# Patient Record
Sex: FEMALE | Race: OTHER | HISPANIC OR LATINO | Employment: FULL TIME | URBAN - NONMETROPOLITAN AREA
[De-identification: names, ages, dates, MRNs, and addresses within clinical notes are randomized per-mention and may not be internally consistent; named-entity substitution may affect disease eponyms.]

---

## 2017-10-03 ENCOUNTER — OFFICE VISIT (OUTPATIENT)
Dept: MEDICAL GROUP | Facility: PHYSICIAN GROUP | Age: 37
End: 2017-10-03

## 2017-10-03 VITALS
BODY MASS INDEX: 37.53 KG/M2 | OXYGEN SATURATION: 97 % | RESPIRATION RATE: 18 BRPM | HEART RATE: 68 BPM | SYSTOLIC BLOOD PRESSURE: 178 MMHG | WEIGHT: 211.8 LBS | HEIGHT: 63 IN | DIASTOLIC BLOOD PRESSURE: 102 MMHG | TEMPERATURE: 97.6 F

## 2017-10-03 DIAGNOSIS — I10 HYPERTENSION, UNSPECIFIED TYPE: ICD-10-CM

## 2017-10-03 DIAGNOSIS — E66.9 OBESITY (BMI 35.0-39.9 WITHOUT COMORBIDITY): ICD-10-CM

## 2017-10-03 DIAGNOSIS — Z13.6 SCREENING FOR CARDIOVASCULAR CONDITION: ICD-10-CM

## 2017-10-03 DIAGNOSIS — Z00.00 HEALTHCARE MAINTENANCE: ICD-10-CM

## 2017-10-03 PROCEDURE — 99204 OFFICE O/P NEW MOD 45 MIN: CPT | Performed by: NURSE PRACTITIONER

## 2017-10-03 RX ORDER — LISINOPRIL AND HYDROCHLOROTHIAZIDE 20; 12.5 MG/1; MG/1
1 TABLET ORAL DAILY
Qty: 90 TAB | Refills: 1 | Status: SHIPPED | OUTPATIENT
Start: 2017-10-03 | End: 2018-03-26 | Stop reason: SDUPTHER

## 2017-10-03 RX ORDER — LISINOPRIL AND HYDROCHLOROTHIAZIDE 20; 12.5 MG/1; MG/1
1 TABLET ORAL DAILY
COMMUNITY
End: 2017-10-03 | Stop reason: SDUPTHER

## 2017-10-03 ASSESSMENT — PATIENT HEALTH QUESTIONNAIRE - PHQ9: CLINICAL INTERPRETATION OF PHQ2 SCORE: 0

## 2017-10-03 NOTE — PATIENT INSTRUCTIONS
Labs--in next couple of days    Will get outside records from Dr. Keith    Refilled BP medication     Follow up in 1 week with MA for BP check    Follow up with me in 2 months, sooner if needed

## 2017-10-03 NOTE — ASSESSMENT & PLAN NOTE
Patient is due for labs, these have been ordered. She declines flu shot today. We will get her outside records to determine status of other healthcare maintenance exams.

## 2017-10-03 NOTE — ASSESSMENT & PLAN NOTE
This is a chronic condition currently under control due to being out of medications for 6 weeks. Her blood pressure today is 178/102, and she does complain of headache, but does deny chest pain, shortness of breath, visual changes, tinnitus, epistaxis, peripheral edema or near-syncope. She was previously treated by provider in [place], we have requested those records. She states that she was instructed to follow-up with further evaluation including labs, she is not sure whether or not an ultrasound of her renal arteries was ordered. I did discuss with her the importance of further investigation including labs as well as ultrasound. Medication has been refilled. I would like her to return in one week to have her blood pressure checked by MA and I would like to see her back in 2 months with labs before her visit. She was given strict ER precautions.

## 2017-10-03 NOTE — ASSESSMENT & PLAN NOTE
Chronic, uncontrolled. Weight is 211 pounds, BMI 37.52. She was encouraged to eat healthy diet, increase physical activity when able and continue efforts towards weight loss. We will reassess this at her follow-up appointment.

## 2018-02-12 ENCOUNTER — OFFICE VISIT (OUTPATIENT)
Dept: MEDICAL GROUP | Facility: PHYSICIAN GROUP | Age: 38
End: 2018-02-12

## 2018-02-12 VITALS
SYSTOLIC BLOOD PRESSURE: 120 MMHG | HEART RATE: 96 BPM | BODY MASS INDEX: 35.97 KG/M2 | HEIGHT: 63 IN | WEIGHT: 203 LBS | OXYGEN SATURATION: 100 % | TEMPERATURE: 97.6 F | RESPIRATION RATE: 16 BRPM | DIASTOLIC BLOOD PRESSURE: 76 MMHG

## 2018-02-12 DIAGNOSIS — E11.9 TYPE 2 DIABETES MELLITUS WITHOUT COMPLICATION, WITHOUT LONG-TERM CURRENT USE OF INSULIN (HCC): ICD-10-CM

## 2018-02-12 DIAGNOSIS — N92.1 MENORRHAGIA WITH IRREGULAR CYCLE: ICD-10-CM

## 2018-02-12 DIAGNOSIS — E66.9 OBESITY (BMI 35.0-39.9 WITHOUT COMORBIDITY): ICD-10-CM

## 2018-02-12 DIAGNOSIS — I10 HYPERTENSION, UNSPECIFIED TYPE: ICD-10-CM

## 2018-02-12 PROCEDURE — 99214 OFFICE O/P EST MOD 30 MIN: CPT | Performed by: NURSE PRACTITIONER

## 2018-02-13 NOTE — ASSESSMENT & PLAN NOTE
This is a chronic condition, uncontrolled. Her weight is 203 pounds, BMI is 35.96. She is currently working on this especially since been recently diagnosed with type 2 diabetes. She is working on healthy diet, regular physical activity and continued efforts towards weight loss.

## 2018-02-13 NOTE — PROGRESS NOTES
Chief Complaint   Patient presents with   • Diabetes     fv [place] [facility]         This is a 37 y.o.female patient that presents today with the following: Status post hospitalization, recently diagnosed as diabetic    Type 2 diabetes mellitus without complication, without long-term current use of insulin (CMS-HCC)  The patient recently diagnosed with type 2 diabetes. She presented to the emergency room with allover body aches and a general feeling of unwellness. She thought that she had the flu, she was ultimately diagnosed with type 2 diabetes. Blood sugars were in the 500s. She does not recall what her hemoglobin A1c was. We have requested those records. She is on metformin now, 500 mg twice daily. She is also on lisinopril/hydrochlorothiazide 20-12.5 mg, she was very on this medication. She states that her blood sugars now are down into the low 100s.   She states she's been working very hard on her diet and exercise. She is going to follow-up with me in 3 months with routine fasting labs done before visit.    Obesity (BMI 35.0-39.9 without comorbidity) ([facility])  This is a chronic condition, uncontrolled. Her weight is 203 pounds, BMI is 35.96. She is currently working on this especially since been recently diagnosed with type 2 diabetes. She is working on healthy diet, regular physical activity and continued efforts towards weight loss.    Menorrhagia with irregular cycle  Patient would like referral to OB/GYN to discuss options for menorrhagia with irregular cycle. She has significantly painful and heavy periods. Referral has been placed for her.    Hypertension  This is a chronic condition, stable and fairly well-controlled on medications including lisinopril-change her thiazide. Blood pressure today is 120/76 and she denies symptoms of hypertension. She does not need refills at this time, but can call as needed. She tolerates it well with no significant bothersome side effects. She is going to have routine fasting  "labs to before she follows up with me in 3 months.      No visits with results within 1 Month(s) from this visit.   Latest known visit with results is:   No results found for any previous visit.         clinical course has been stable    Past Medical History:   Diagnosis Date   • Hypertension 2014       History reviewed. No pertinent surgical history.    History reviewed. No pertinent family history.    Patient has no known allergies.    Current Outpatient Prescriptions Ordered in [facility]   Medication Sig Dispense Refill   • metFORMIN (GLUCOPHAGE) 500 MG Tab Take 500 mg by mouth 2 times a day, with meals.     • lisinopril-hydrochlorothiazide (PRINZIDE, ZESTORETIC) 20-12.5 MG per tablet Take 1 Tab by mouth every day. 90 Tab 1     No current Epic-ordered facility-administered medications on file.        Constitutional ROS: No unexpected change in weight, No weakness, No unexplained fevers, sweats, or chills  Pulmonary ROS: No chronic cough, sputum, or hemoptysis, No shortness of breath, No recent change in breathing  Cardiovascular ROS: No chest pain, No edema, No palpitations, Positive for hypertension, per history of present illness  Gastrointestinal ROS: No abdominal pain, No nausea, vomiting, diarrhea, or constipation  Musculoskeletal/Extremities ROS: No clubbing, No peripheral edema, No pain, redness or swelling on the joints  Neurologic ROS: Normal development, No seizures, No weakness  Endocrine ROS: Positive per history of present illness  Genitourinary ROS: Positive per history of present illness    Physical exam:  /76   Pulse 96   Temp 36.4 °C (97.6 °F)   Resp 16   Ht 1.6 m (5' 3\")   Wt 92.1 kg (203 lb)   SpO2 100%   BMI 35.96 kg/m²   General Appearance: Young female, alert, no distress, obese, well-groomed  Skin: Skin color, texture, turgor normal. No rashes or lesions.  Lungs: negative findings: normal respiratory rate and rhythm, lungs clear to auscultation  Heart: negative. RRR without murmur, " gallop, or rubs.  No ectopy.  Abdomen: Abdomen soft, non-tender. BS normal. No masses,  No organomegaly  Musculoskeletal: negative findings: ROM of all joints is normal, no evidence of joint instability, strength normal, no deformities present  Neurologic: intact, oriented, mood appropriate, judgment intact. Cranial nerves II through XII grossly intact    Medical decision making/discussion: Patient to follow-up with me in 3 months with labs before visit. She is to continue working on healthy diet, regular physical activity and continued efforts towards weight loss. She has been referred to OB/GYN for further evaluation and treatment of menorrhagia.    Avril was seen today for diabetes.    Diagnoses and all orders for this visit:    Type 2 diabetes mellitus without complication, without long-term current use of insulin (CMS-[facility])  -     MICROALBUMIN CREAT RATIO URINE; Future  -     HEMOGLOBIN A1C; Future    Obesity (BMI 35.0-39.9 without comorbidity) ([facility])    Hypertension, unspecified type    Menorrhagia with irregular cycle  -     REFERRAL TO OB/GYN          Please note that this dictation was created using voice recognition software. I have made every reasonable attempt to correct obvious errors, but I expect that there are errors of grammar and possibly content that I did not discover before finalizing the note.

## 2018-02-13 NOTE — ASSESSMENT & PLAN NOTE
Patient would like referral to OB/GYN to discuss options for menorrhagia with irregular cycle. She has significantly painful and heavy periods. Referral has been placed for her.

## 2018-02-13 NOTE — TELEPHONE ENCOUNTER
*Medication is listed as historical*  Was the patient seen in the last year in this department? Yes     Does patient have an active prescription for medications requested? No     Received Request Via: Pharmacy    Pt met protocol?: Yes     Last OV 02/2018  No results found for: HBA1C     No results found for: CHOLSTRLTOT, TRIGLYCERIDE, HDL, LDL, CHOLHDLRAT, NONHDL

## 2018-02-13 NOTE — ASSESSMENT & PLAN NOTE
The patient recently diagnosed with type 2 diabetes. She presented to the emergency room with allover body aches and a general feeling of unwellness. She thought that she had the flu, she was ultimately diagnosed with type 2 diabetes. Blood sugars were in the 500s. She does not recall what her hemoglobin A1c was. We have requested those records. She is on metformin now, 500 mg twice daily. She is also on lisinopril/hydrochlorothiazide 20-12.5 mg, she was very on this medication. She states that her blood sugars now are down into the low 100s.   She states she's been working very hard on her diet and exercise. She is going to follow-up with me in 3 months with routine fasting labs done before visit.

## 2018-02-13 NOTE — ASSESSMENT & PLAN NOTE
This is a chronic condition, stable and fairly well-controlled on medications including lisinopril-change her thiazide. Blood pressure today is 120/76 and she denies symptoms of hypertension. She does not need refills at this time, but can call as needed. She tolerates it well with no significant bothersome side effects. She is going to have routine fasting labs to before she follows up with me in 3 months.

## 2018-03-26 DIAGNOSIS — I10 HYPERTENSION, UNSPECIFIED TYPE: ICD-10-CM

## 2018-03-27 NOTE — TELEPHONE ENCOUNTER
Was the patient seen in the last year in this department? Yes     Does patient have an active prescription for medications requested? No     Received Request Via: Pharmacy      Pt met protocol?: Yes pt last ov 2/18   BP Readings from Last 1 Encounters:   02/12/18 120/76

## 2018-03-28 RX ORDER — LISINOPRIL AND HYDROCHLOROTHIAZIDE 20; 12.5 MG/1; MG/1
TABLET ORAL
Qty: 90 TAB | Refills: 0 | Status: SHIPPED | OUTPATIENT
Start: 2018-03-28 | End: 2018-06-19 | Stop reason: SDUPTHER

## 2018-11-12 ENCOUNTER — OFFICE VISIT (OUTPATIENT)
Dept: MEDICAL GROUP | Facility: PHYSICIAN GROUP | Age: 38
End: 2018-11-12

## 2018-11-12 VITALS
DIASTOLIC BLOOD PRESSURE: 80 MMHG | HEIGHT: 63 IN | HEART RATE: 62 BPM | SYSTOLIC BLOOD PRESSURE: 138 MMHG | WEIGHT: 198 LBS | TEMPERATURE: 97.5 F | OXYGEN SATURATION: 100 % | RESPIRATION RATE: 18 BRPM | BODY MASS INDEX: 35.08 KG/M2

## 2018-11-12 DIAGNOSIS — I10 HYPERTENSION, UNSPECIFIED TYPE: ICD-10-CM

## 2018-11-12 DIAGNOSIS — E11.9 TYPE 2 DIABETES MELLITUS WITHOUT COMPLICATION, WITHOUT LONG-TERM CURRENT USE OF INSULIN (HCC): ICD-10-CM

## 2018-11-12 PROCEDURE — 99214 OFFICE O/P EST MOD 30 MIN: CPT | Performed by: NURSE PRACTITIONER

## 2018-11-12 RX ORDER — LISINOPRIL AND HYDROCHLOROTHIAZIDE 20; 12.5 MG/1; MG/1
TABLET ORAL
Qty: 90 TAB | Refills: 0 | Status: SHIPPED | OUTPATIENT
Start: 2018-11-12 | End: 2019-02-15 | Stop reason: SDUPTHER

## 2018-11-12 ASSESSMENT — PATIENT HEALTH QUESTIONNAIRE - PHQ9: CLINICAL INTERPRETATION OF PHQ2 SCORE: 0

## 2018-11-12 NOTE — PROGRESS NOTES
Chief Complaint   Patient presents with   • Hypertension     fv, med refills         This is a 37 y.o.female patient that presents today with the following: Medication refills, discuss chronic conditions    Hypertension  Patient here for follow-up of hypertension, she was last seen in February 2018.  Blood pressure today was 138/80 and she denies symptoms of hypertension.  She is currently on lisinopril-hydrochlorothiazide and states she is tolerating this well.  She was to have labs done after her last visit, she did not have them done.  She was advised to have her labs done as soon as possible, blood pressure medications were refilled for 90 days only until she can have her labs done.    Type 2 diabetes mellitus without complication, without long-term current use of insulin (CMS-HCC) (HCC)  When patient initially establish care with me February 2018 she had recently been diagnosed with type 2 diabetes.  She went to the emergency room with all over body aches and a general feeling of unwellness and was found to have random blood sugars in the 500s and she did not recall what her hemoglobin A1c was at that time.  She was started on metformin 500 mg twice daily, but felt that she did not need this medication anymore as her home blood sugars have been within normal limits.  I discussed with her the importance of having her labs done so we can determine what the status is and if we need to make any changes to her medications.  We discussed the importance of treating type 2 diabetes to protect the end organs.  She states she will have her labs done in the next couple of days.  We will go ahead and fill her metformin so she can have on hand should she need to start this medication.      No visits with results within 1 Month(s) from this visit.   Latest known visit with results is:   No results found for any previous visit.         clinical course has been stable    Past Medical History:   Diagnosis Date   • Hypertension  "2014       No past surgical history on file.    No family history on file.    Patient has no known allergies.    Current Outpatient Prescriptions Ordered in [facility]   Medication Sig Dispense Refill   • lisinopril-hydrochlorothiazide (PRINZIDE, ZESTORETIC) 20-12.5 MG per tablet Take daily 90 Tab 0   • metFORMIN (GLUCOPHAGE) 500 MG Tab Take 1 pill twice daily 180 Tab 0     No current Epic-ordered facility-administered medications on file.        Constitutional ROS: No unexpected change in weight, No weakness, No unexplained fevers, sweats, or chills  Pulmonary ROS: No chronic cough, sputum, or hemoptysis, No shortness of breath, No recent change in breathing  Cardiovascular ROS: No chest pain, No edema, No palpitations, Positive for hypertension   Musculoskeletal/Extremities ROS: No clubbing, No peripheral edema, No pain, redness or swelling on the joints  Neurologic ROS: Normal development, No seizures, No weakness  Endocrine ROS: Positive per HPI    Physical exam:  /80 (BP Location: Left arm, Patient Position: Sitting, BP Cuff Size: Adult)   Pulse 62   Temp 36.4 °C (97.5 °F) (Temporal)   Resp 18   Ht 1.6 m (5' 3\")   Wt 89.8 kg (198 lb)   SpO2 100%   BMI 35.07 kg/m²   General Appearance: Middle-aged female, alert, no distress, obese, well-groomed  Skin: Skin color, texture, turgor normal. No rashes or lesions.  Lungs: negative findings: normal respiratory rate and rhythm, lungs clear to auscultation  Heart: negative. RRR without murmur, gallop, or rubs.  No ectopy.  Abdomen: Abdomen soft, non-tender. BS normal. No masses,  No organomegaly  Musculoskeletal: negative findings: ROM of all joints is normal, strength normal, no deformities present  Neurologic: intact, CN II through XII grossly intact    Medical decision making/discussion: Patient was strongly advised to have her labs done in the next couple of days, she will be notified of results and further actions if needed.  We will refill her medications, " she is to take the blood pressure medication consistently.  Will only fill for 90 days until she can have her labs done.  She is to follow-up with me in 4 months, sooner if needed.    Avril was seen today for hypertension.    Diagnoses and all orders for this visit:    Hypertension, unspecified type  -     COMP METABOLIC PANEL; Future  -     Lipid Profile; Future  -     lisinopril-hydrochlorothiazide (PRINZIDE, ZESTORETIC) 20-12.5 MG per tablet; Take daily    Type 2 diabetes mellitus without complication, without long-term current use of insulin (HCC)  -     COMP METABOLIC PANEL; Future  -     Lipid Profile; Future  -     HEMOGLOBIN A1C; Future  -     MICROALBUMIN CREAT RATIO URINE; Future  -     metFORMIN (GLUCOPHAGE) 500 MG Tab; Take 1 pill twice daily          Please note that this dictation was created using voice recognition software. I have made every reasonable attempt to correct obvious errors, but I expect that there are errors of grammar and possibly content that I did not discover before finalizing the note.

## 2018-11-13 NOTE — ASSESSMENT & PLAN NOTE
When patient initially establish care with me February 2018 she had recently been diagnosed with type 2 diabetes.  She went to the emergency room with all over body aches and a general feeling of unwellness and was found to have random blood sugars in the 500s and she did not recall what her hemoglobin A1c was at that time.  She was started on metformin 500 mg twice daily, but felt that she did not need this medication anymore as her home blood sugars have been within normal limits.  I discussed with her the importance of having her labs done so we can determine what the status is and if we need to make any changes to her medications.  We discussed the importance of treating type 2 diabetes to protect the end organs.  She states she will have her labs done in the next couple of days.  We will go ahead and fill her metformin so she can have on hand should she need to start this medication.

## 2018-11-13 NOTE — PATIENT INSTRUCTIONS
Have your labs done in the next couple of days    BP med filled for 90 days until labs are done    See me again in 4 months

## 2018-11-13 NOTE — ASSESSMENT & PLAN NOTE
Patient here for follow-up of hypertension, she was last seen in February 2018.  Blood pressure today was 138/80 and she denies symptoms of hypertension.  She is currently on lisinopril-hydrochlorothiazide and states she is tolerating this well.  She was to have labs done after her last visit, she did not have them done.  She was advised to have her labs done as soon as possible, blood pressure medications were refilled for 90 days only until she can have her labs done.

## 2019-02-14 ENCOUNTER — HOSPITAL ENCOUNTER (OUTPATIENT)
Dept: LAB | Facility: MEDICAL CENTER | Age: 39
End: 2019-02-14
Attending: NURSE PRACTITIONER

## 2019-02-14 DIAGNOSIS — E11.9 TYPE 2 DIABETES MELLITUS WITHOUT COMPLICATION, WITHOUT LONG-TERM CURRENT USE OF INSULIN (HCC): ICD-10-CM

## 2019-02-14 DIAGNOSIS — I10 HYPERTENSION, UNSPECIFIED TYPE: ICD-10-CM

## 2019-02-14 LAB
ALBUMIN SERPL BCP-MCNC: 4 G/DL (ref 3.2–4.9)
ALBUMIN/GLOB SERPL: 1 G/DL
ALP SERPL-CCNC: 110 U/L (ref 30–99)
ALT SERPL-CCNC: 17 U/L (ref 2–50)
ANION GAP SERPL CALC-SCNC: 6 MMOL/L (ref 0–11.9)
AST SERPL-CCNC: 19 U/L (ref 12–45)
BILIRUB SERPL-MCNC: 0.3 MG/DL (ref 0.1–1.5)
BUN SERPL-MCNC: 16 MG/DL (ref 8–22)
CALCIUM SERPL-MCNC: 9.7 MG/DL (ref 8.5–10.5)
CHLORIDE SERPL-SCNC: 101 MMOL/L (ref 96–112)
CHOLEST SERPL-MCNC: 135 MG/DL (ref 100–199)
CO2 SERPL-SCNC: 28 MMOL/L (ref 20–33)
CREAT SERPL-MCNC: 0.69 MG/DL (ref 0.5–1.4)
CREAT UR-MCNC: 119.7 MG/DL
EST. AVERAGE GLUCOSE BLD GHB EST-MCNC: 123 MG/DL
GLOBULIN SER CALC-MCNC: 4.2 G/DL (ref 1.9–3.5)
GLUCOSE SERPL-MCNC: 90 MG/DL (ref 65–99)
HBA1C MFR BLD: 5.9 % (ref 0–5.6)
HDLC SERPL-MCNC: 43 MG/DL
LDLC SERPL CALC-MCNC: 70 MG/DL
MICROALBUMIN UR-MCNC: <0.7 MG/DL
MICROALBUMIN/CREAT UR: NORMAL MG/G (ref 0–30)
POTASSIUM SERPL-SCNC: 3.9 MMOL/L (ref 3.6–5.5)
PROT SERPL-MCNC: 8.2 G/DL (ref 6–8.2)
SODIUM SERPL-SCNC: 135 MMOL/L (ref 135–145)
TRIGL SERPL-MCNC: 111 MG/DL (ref 0–149)

## 2019-02-14 PROCEDURE — 80053 COMPREHEN METABOLIC PANEL: CPT

## 2019-02-14 PROCEDURE — 36415 COLL VENOUS BLD VENIPUNCTURE: CPT

## 2019-02-14 PROCEDURE — 82570 ASSAY OF URINE CREATININE: CPT

## 2019-02-14 PROCEDURE — 83036 HEMOGLOBIN GLYCOSYLATED A1C: CPT

## 2019-02-14 PROCEDURE — 82043 UR ALBUMIN QUANTITATIVE: CPT

## 2019-02-14 PROCEDURE — 80061 LIPID PANEL: CPT

## 2019-02-15 DIAGNOSIS — E11.9 TYPE 2 DIABETES MELLITUS WITHOUT COMPLICATION, WITHOUT LONG-TERM CURRENT USE OF INSULIN (HCC): ICD-10-CM

## 2019-02-15 DIAGNOSIS — I10 HYPERTENSION, UNSPECIFIED TYPE: ICD-10-CM

## 2019-02-19 RX ORDER — LISINOPRIL AND HYDROCHLOROTHIAZIDE 20; 12.5 MG/1; MG/1
TABLET ORAL
Qty: 90 TAB | Refills: 0 | Status: SHIPPED | OUTPATIENT
Start: 2019-02-19 | End: 2019-05-20 | Stop reason: SDUPTHER

## 2019-02-19 NOTE — TELEPHONE ENCOUNTER
Was the patient seen in the last year in this department? Yes    Does patient have an active prescription for medications requested? No     Received Request Via: Pharmacy      Pt met protocol?: Yes    OV 11/18    BP Readings from Last 1 Encounters:   11/12/18 138/80      A1C 2/18

## 2019-02-25 ENCOUNTER — TELEPHONE (OUTPATIENT)
Dept: MEDICAL GROUP | Facility: PHYSICIAN GROUP | Age: 39
End: 2019-02-25

## 2019-02-25 NOTE — TELEPHONE ENCOUNTER
I sent the below info to patient via a Mint message a week ago--pt still has not read message. Please call pt with message. Thank you.

## 2019-02-25 NOTE — TELEPHONE ENCOUNTER
----- Message from Avril Keller sent at 2/25/2019  5:00 AM PST -----  Regarding: Labs  Avril,  Overall, your labs look pretty good. Will discuss them more at your next appt in March  Kitty

## 2019-05-06 ENCOUNTER — OFFICE VISIT (OUTPATIENT)
Dept: MEDICAL GROUP | Facility: PHYSICIAN GROUP | Age: 39
End: 2019-05-06
Payer: MEDICARE

## 2019-05-06 VITALS
HEART RATE: 65 BPM | HEIGHT: 63 IN | BODY MASS INDEX: 35.61 KG/M2 | DIASTOLIC BLOOD PRESSURE: 78 MMHG | TEMPERATURE: 97.6 F | RESPIRATION RATE: 16 BRPM | WEIGHT: 201 LBS | OXYGEN SATURATION: 96 % | SYSTOLIC BLOOD PRESSURE: 124 MMHG

## 2019-05-06 DIAGNOSIS — I10 HYPERTENSION, UNSPECIFIED TYPE: ICD-10-CM

## 2019-05-06 DIAGNOSIS — R79.89 ELEVATED LIVER FUNCTION TESTS: ICD-10-CM

## 2019-05-06 DIAGNOSIS — Z12.39 SCREENING FOR BREAST CANCER: ICD-10-CM

## 2019-05-06 DIAGNOSIS — E11.9 TYPE 2 DIABETES MELLITUS WITHOUT COMPLICATION, WITHOUT LONG-TERM CURRENT USE OF INSULIN (HCC): ICD-10-CM

## 2019-05-06 PROCEDURE — 99214 OFFICE O/P EST MOD 30 MIN: CPT | Performed by: NURSE PRACTITIONER

## 2019-05-06 NOTE — PROGRESS NOTES
Chief Complaint   Patient presents with   • Follow-Up     labs         This is a 38 y.o.female patient that presents today with the following: Follow-up visit, review labs    Hypertension  Chronic and stable, very well controlled on lisinopril hydrochlorothiazide.  Blood pressure today is 124/78 and she denies symptoms of hypertension.  She is up-to-date with labs but will be due again in September, these have been ordered.  She does not need refills on her medications at this time.    Type 2 diabetes mellitus without complication, without long-term current use of insulin (CMS-HCC) (HCC)  This is a chronic condition, stable and very well controlled on metformin 500 mg twice daily.  Her most recent A1c was 5.9%.  She is interested in coming off medication completely, we will have her decrease down to 1 500 mg pill per day and we will recheck labs again in September.  She is appropriately on ACE inhibitor, but not currently on a statin.  She has made major lifestyle modifications and feels she is doing very well with this.    Elevated liver function tests  Patient had very mildly elevated alkaline phosphatase.  Discussed with her the various reasons for an increase in liver function test.  She does deny excessive use of Tylenol or alcohol.  I did discuss with her it could also be due to weight loss or weight gain.  We are going to recheck this again in August.      No visits with results within 1 Month(s) from this visit.   Latest known visit with results is:   Hospital Outpatient Visit on 02/14/2019   Component Date Value   • Sodium 02/14/2019 135    • Potassium 02/14/2019 3.9    • Chloride 02/14/2019 101    • Co2 02/14/2019 28    • Anion Gap 02/14/2019 6.0    • Glucose 02/14/2019 90    • Bun 02/14/2019 16    • Creatinine 02/14/2019 0.69    • Calcium 02/14/2019 9.7    • AST(SGOT) 02/14/2019 19    • ALT(SGPT) 02/14/2019 17    • Alkaline Phosphatase 02/14/2019 110*   • Total Bilirubin 02/14/2019 0.3    • Albumin  "02/14/2019 4.0    • Total Protein 02/14/2019 8.2    • Globulin 02/14/2019 4.2*   • A-G Ratio 02/14/2019 1.0    • Cholesterol,Tot 02/14/2019 135    • Triglycerides 02/14/2019 111    • HDL 02/14/2019 43    • LDL 02/14/2019 70    • Glycohemoglobin 02/14/2019 5.9*   • Est Avg Glucose 02/14/2019 123    • Creatinine, Urine 02/14/2019 119.70    • Microalbumin, Urine Rand* 02/14/2019 <0.7    • Micro Alb Creat Ratio 02/14/2019 see below    • GFR If  02/14/2019 >60    • GFR If Non  Ameri* 02/14/2019 >60          clinical course has been stable    Past Medical History:   Diagnosis Date   • Hypertension 2014       No past surgical history on file.    No family history on file.    Patient has no known allergies.    Current Outpatient Prescriptions Ordered in [facility]   Medication Sig Dispense Refill   • metFORMIN (GLUCOPHAGE) 500 MG Tab TAKE 1 TABLET BY MOUTH daily 90 Tab 1   • lisinopril-hydrochlorothiazide (PRINZIDE, ZESTORETIC) 20-12.5 MG per tablet TAKE 1 TABLET BY MOUTH ONCE DAILY 90 Tab 0     No current Epic-ordered facility-administered medications on file.        Constitutional ROS: No unexpected change in weight, No weakness, No unexplained fevers, sweats, or chills  Pulmonary ROS: No chronic cough, sputum, or hemoptysis, No shortness of breath, No recent change in breathing  Cardiovascular ROS: No chest pain, No edema, No palpitations, Positive for hypertension   Gastrointestinal ROS: No abdominal pain, No nausea, vomiting, diarrhea, or constipation  Musculoskeletal/Extremities ROS: No clubbing, No peripheral edema, No pain, redness or swelling on the joints  Neurologic ROS: Normal development, No seizures, No weakness  Endocrine ROS: Positive per HPI    Physical exam:  /78 (BP Location: Right arm, Patient Position: Sitting, BP Cuff Size: Adult)   Pulse 65   Temp 36.4 °C (97.6 °F) (Temporal)   Resp 16   Ht 1.6 m (5' 3\")   Wt 91.2 kg (201 lb)   SpO2 96%   BMI 35.61 kg/m²   General " Appearance: Pleasant middle-aged female, alert, no distress, obese, well-groomed  Skin: Skin color, texture, turgor normal. No rashes or lesions.  Lungs: negative findings: normal respiratory rate and rhythm, lungs clear to auscultation  Heart: negative. RRR without murmur, gallop, or rubs.  No ectopy.  Abdomen: Abdomen soft, non-tender. BS normal. No masses,  No organomegaly  Musculoskeletal: negative findings: no evidence of joint instability, no evidence of muscle atrophy, no deformities present  Neurologic: intact, CN II through XII grossly intact    Medical decision making/discussion: Patient is going to follow-up with me in September with labs done before visit.  She is going to decrease her metformin to 500 mg daily.  She is due for her first mammogram, this is been ordered.  She was encouraged to continue with healthy lifestyle modifications including healthy diet, regular exercise and continued efforts towards weight loss.    Avril was seen today for follow-up.    Diagnoses and all orders for this visit:    Hypertension, unspecified type    Type 2 diabetes mellitus without complication, without long-term current use of insulin (HCC)  -     Comp Metabolic Panel; Future  -     HEMOGLOBIN A1C; Future  -     metFORMIN (GLUCOPHAGE) 500 MG Tab; TAKE 1 TABLET BY MOUTH daily    Elevated liver function tests  -     Comp Metabolic Panel; Future    Screening for breast cancer  -     MA-SCREEN MAMMO W/CAD-BILAT; Future        Return in about 4 months (around 9/6/2019) for Follow-up, Discuss Labs.        Please note that this dictation was created using voice recognition software. I have made every reasonable attempt to correct obvious errors, but I expect that there are errors of grammar and possibly content that I did not discover before finalizing the note.

## 2019-05-07 PROBLEM — R79.89 ELEVATED LIVER FUNCTION TESTS: Status: ACTIVE | Noted: 2019-05-07

## 2019-05-07 NOTE — ASSESSMENT & PLAN NOTE
Patient had very mildly elevated alkaline phosphatase.  Discussed with her the various reasons for an increase in liver function test.  She does deny excessive use of Tylenol or alcohol.  I did discuss with her it could also be due to weight loss or weight gain.  We are going to recheck this again in August.

## 2019-05-07 NOTE — ASSESSMENT & PLAN NOTE
This is a chronic condition, stable and very well controlled on metformin 500 mg twice daily.  Her most recent A1c was 5.9%.  She is interested in coming off medication completely, we will have her decrease down to 1 500 mg pill per day and we will recheck labs again in September.  She is appropriately on ACE inhibitor, but not currently on a statin.  She has made major lifestyle modifications and feels she is doing very well with this.

## 2019-05-07 NOTE — ASSESSMENT & PLAN NOTE
Chronic and stable, very well controlled on lisinopril hydrochlorothiazide.  Blood pressure today is 124/78 and she denies symptoms of hypertension.  She is up-to-date with labs but will be due again in September, these have been ordered.  She does not need refills on her medications at this time.

## 2019-05-20 DIAGNOSIS — I10 HYPERTENSION, UNSPECIFIED TYPE: ICD-10-CM

## 2019-05-20 NOTE — TELEPHONE ENCOUNTER
Was the patient seen in the last year in this department? Yes    Does patient have an active prescription for medications requested? No     Received Request Via: Pharmacy      Pt met protocol?: Yes, OV earlier this month   BP Readings from Last 1 Encounters:   05/06/19 124/78

## 2019-05-21 RX ORDER — LISINOPRIL AND HYDROCHLOROTHIAZIDE 20; 12.5 MG/1; MG/1
TABLET ORAL
Qty: 100 TAB | Refills: 1 | Status: SHIPPED | OUTPATIENT
Start: 2019-05-21 | End: 2019-11-21 | Stop reason: SDUPTHER

## 2019-05-21 NOTE — TELEPHONE ENCOUNTER
Refill X 6 months, sent to pharmacy.Pt. Seen in the last 6 months per protocol.   Lab Results   Component Value Date/Time    SODIUM 135 02/14/2019 09:41 AM    POTASSIUM 3.9 02/14/2019 09:41 AM    CHLORIDE 101 02/14/2019 09:41 AM    CO2 28 02/14/2019 09:41 AM    GLUCOSE 90 02/14/2019 09:41 AM    BUN 16 02/14/2019 09:41 AM    CREATININE 0.69 02/14/2019 09:41 AM

## 2019-08-21 ENCOUNTER — OFFICE VISIT (OUTPATIENT)
Dept: URGENT CARE | Facility: PHYSICIAN GROUP | Age: 39
End: 2019-08-21
Payer: COMMERCIAL

## 2019-08-21 VITALS
OXYGEN SATURATION: 100 % | SYSTOLIC BLOOD PRESSURE: 120 MMHG | WEIGHT: 200 LBS | RESPIRATION RATE: 16 BRPM | HEIGHT: 63 IN | BODY MASS INDEX: 35.44 KG/M2 | DIASTOLIC BLOOD PRESSURE: 82 MMHG | HEART RATE: 76 BPM | TEMPERATURE: 98.3 F

## 2019-08-21 DIAGNOSIS — E66.9 OBESITY (BMI 30-39.9): ICD-10-CM

## 2019-08-21 DIAGNOSIS — M53.3 SI (SACROILIAC) JOINT DYSFUNCTION: ICD-10-CM

## 2019-08-21 DIAGNOSIS — M79.18 MYOFASCIAL PAIN: ICD-10-CM

## 2019-08-21 PROCEDURE — 99214 OFFICE O/P EST MOD 30 MIN: CPT | Performed by: PHYSICIAN ASSISTANT

## 2019-08-21 RX ORDER — CYCLOBENZAPRINE HCL 10 MG
10 TABLET ORAL 3 TIMES DAILY PRN
Qty: 30 TAB | Refills: 0 | Status: SHIPPED | OUTPATIENT
Start: 2019-08-21 | End: 2021-03-04

## 2019-08-21 RX ORDER — METHYLPREDNISOLONE 4 MG/1
4 TABLET ORAL SEE ADMIN INSTRUCTIONS
Qty: 21 TAB | Refills: 0 | Status: SHIPPED | OUTPATIENT
Start: 2019-08-21 | End: 2021-03-04

## 2019-08-21 RX ORDER — DICLOFENAC SODIUM 75 MG/1
75 TABLET, DELAYED RELEASE ORAL 2 TIMES DAILY
Qty: 30 TAB | Refills: 0 | Status: SHIPPED | OUTPATIENT
Start: 2019-08-21 | End: 2021-03-04

## 2019-08-21 NOTE — PROGRESS NOTES
Chief Complaint   Patient presents with   • Back Injury       HISTORY OF PRESENT ILLNESS: Patient is a 38 y.o. female who presents today because She has a 5-day history of pain in her left lower posterior hip area.  This started after moving some furniture over the weekend.  She has been taking Advil for it, she has also been applying Tiger balm.  Both of those helped only minimally.  She denies any distal paresthesias or radiating lower extremity pain.    Patient Active Problem List    Diagnosis Date Noted   • Obesity (BMI 30-39.9) 08/21/2019   • Elevated liver function tests 05/07/2019   • Type 2 diabetes mellitus without complication, without long-term current use of insulin ([facility]) 02/12/2018   • Menorrhagia with irregular cycle 02/12/2018   • Hypertension 10/03/2017   • Healthcare maintenance 10/03/2017   • Obesity (BMI 35.0-39.9 without comorbidity) ([facility]) 10/03/2017       Allergies:Patient has no known allergies.    Current Outpatient Medications Ordered in Epic   Medication Sig Dispense Refill   • cyclobenzaprine (FLEXERIL) 10 MG Tab Take 1 Tab by mouth 3 times a day as needed. 30 Tab 0   • diclofenac EC (VOLTAREN) 75 MG Tablet Delayed Response Take 1 Tab by mouth 2 times a day. 30 Tab 0   • methylPREDNISolone (MEDROL DOSEPAK) 4 MG Tablet Therapy Pack Take 1 Tab by mouth See Admin Instructions. 21 Tab 0   • lisinopril-hydrochlorothiazide (PRINZIDE, ZESTORETIC) 20-12.5 MG per tablet TAKE 1 TABLET BY MOUTH ONCE DAILY 100 Tab 1   • metFORMIN (GLUCOPHAGE) 500 MG Tab TAKE 1 TABLET BY MOUTH daily 90 Tab 1     No current Epic-ordered facility-administered medications on file.        Past Medical History:   Diagnosis Date   • Hypertension 2014       Social History     Tobacco Use   • Smoking status: Former Smoker     Types: Cigarettes     Last attempt to quit: 10/3/2015     Years since quitting: 3.8   • Smokeless tobacco: Former User     Quit date: 10/3/2015   Substance Use Topics   • Alcohol use: Yes     Comment: occ  "  • Drug use: No       No family status information on file.   History reviewed. No pertinent family history.    ROS:  Review of Systems   Constitutional: Negative for fever, chills, weight loss and malaise/fatigue.   HENT: Negative for ear pain, nosebleeds, congestion, sore throat and neck pain.    Eyes: Negative for blurred vision.   Respiratory: Negative for cough, sputum production, shortness of breath and wheezing.    Cardiovascular: Negative for chest pain, palpitations, orthopnea and leg swelling.   Gastrointestinal: Negative for heartburn, nausea, vomiting and abdominal pain.   Genitourinary: Negative for dysuria, urgency and frequency.     Exam:  /82 (BP Location: Right arm, Patient Position: Sitting, BP Cuff Size: Adult)   Pulse 76   Temp 36.8 °C (98.3 °F) (Temporal)   Resp 16   Ht 1.6 m (5' 3\")   Wt 90.7 kg (200 lb)   SpO2 100%   General:  Well nourished, well developed female uncomfortable unless standing  Head:Normocephalic, atraumatic  Eyes: PERRLA, EOM within normal limits, no conjunctival injection, no scleral icterus, visual fields and acuity grossly intact.  Nose: Symmetrical without tenderness, no discharge.  Mouth: reasonable hygiene, no erythema exudates or tonsillar enlargement.  Neck: no masses, range of motion within normal limits, no tracheal deviation. No obvious thyroid enlargement.  Extremities: no clubbing, cyanosis, or edema.  Musculoskeletal: Palpation directly over the left SI joint area.  No vertebral point tenderness, no paraspinous musculature tissue tenderness.    Please note that this dictation was created using voice recognition software. I have made every reasonable attempt to correct obvious errors, but I expect that there are errors of grammar and possibly content that I did not discover before finalizing the note.    Assessment/Plan:  1. SI (sacroiliac) joint dysfunction  diclofenac EC (VOLTAREN) 75 MG Tablet Delayed Response    methylPREDNISolone (MEDROL " DOSEPAK) 4 MG Tablet Therapy Pack   2. Myofascial pain  cyclobenzaprine (FLEXERIL) 10 MG Tab   3. Obesity (BMI 30-39.9)  Patient identified as having weight management issue.  Appropriate orders and counseling given.   Apply ice to the area    Followup with primary care in the next 7-10 days if not significantly improving, return to the urgent care or go to the emergency room sooner for any worsening of symptoms.

## 2019-09-26 ENCOUNTER — OFFICE VISIT (OUTPATIENT)
Dept: MEDICAL GROUP | Facility: PHYSICIAN GROUP | Age: 39
End: 2019-09-26
Payer: COMMERCIAL

## 2019-09-26 VITALS
HEART RATE: 80 BPM | BODY MASS INDEX: 35.3 KG/M2 | SYSTOLIC BLOOD PRESSURE: 116 MMHG | TEMPERATURE: 98.1 F | OXYGEN SATURATION: 95 % | HEIGHT: 63 IN | WEIGHT: 199.2 LBS | DIASTOLIC BLOOD PRESSURE: 82 MMHG | RESPIRATION RATE: 16 BRPM

## 2019-09-26 DIAGNOSIS — I10 HYPERTENSION, UNSPECIFIED TYPE: ICD-10-CM

## 2019-09-26 DIAGNOSIS — E11.9 TYPE 2 DIABETES MELLITUS WITHOUT COMPLICATION, WITHOUT LONG-TERM CURRENT USE OF INSULIN (HCC): ICD-10-CM

## 2019-09-26 LAB
HBA1C MFR BLD: 5.9 % (ref 0–5.6)
INT CON NEG: ABNORMAL
INT CON POS: ABNORMAL

## 2019-09-26 PROCEDURE — 99214 OFFICE O/P EST MOD 30 MIN: CPT | Performed by: NURSE PRACTITIONER

## 2019-09-26 PROCEDURE — 83036 HEMOGLOBIN GLYCOSYLATED A1C: CPT | Performed by: NURSE PRACTITIONER

## 2019-09-26 RX ORDER — ROSUVASTATIN CALCIUM 10 MG/1
10 TABLET, COATED ORAL EVERY EVENING
Qty: 90 TAB | Refills: 1 | Status: SHIPPED | OUTPATIENT
Start: 2019-09-26 | End: 2021-03-04

## 2019-09-26 ASSESSMENT — PAIN SCALES - GENERAL: PAINLEVEL: NO PAIN

## 2019-09-26 ASSESSMENT — PATIENT HEALTH QUESTIONNAIRE - PHQ9: CLINICAL INTERPRETATION OF PHQ2 SCORE: 0

## 2019-09-26 NOTE — PATIENT INSTRUCTIONS
meds refilled, will start you on low dose statin    See me in March with labs before visit

## 2019-09-26 NOTE — PROGRESS NOTES
Chief Complaint   Patient presents with   • Diabetes     FV for Labs         This is a 38 y.o.female patient that presents today with the following: Review labs    Hypertension  This is a chronic condition, stable and very well controlled on current medication including lisinopril hydrochlorothiazide.  Blood pressure today within normal limits and she denies symptoms of hypertension.  She is up-to-date with labs, does not need refills at this time.    Type 2 diabetes mellitus without complication, without long-term current use of insulin (CMS-HCC) (HCC)  This is a chronic condition, stable and very well controlled on metformin 500 mg daily, A1c is 5.9%.  She will continue on this current dose for 6 more months and we will determine if she can completely come off of the medication.  She has been working very hard on lifestyle modifications, she is congratulated for this.  She does agree to starting statin especially in the setting of her type 2 diabetes.  She is due for monofilament exam, this is completed today.  She is going to follow-up with me in 6 months with labs done before visit.      No visits with results within 1 Month(s) from this visit.   Latest known visit with results is:   Hospital Outpatient Visit on 02/14/2019   Component Date Value   • Sodium 02/14/2019 135    • Potassium 02/14/2019 3.9    • Chloride 02/14/2019 101    • Co2 02/14/2019 28    • Anion Gap 02/14/2019 6.0    • Glucose 02/14/2019 90    • Bun 02/14/2019 16    • Creatinine 02/14/2019 0.69    • Calcium 02/14/2019 9.7    • AST(SGOT) 02/14/2019 19    • ALT(SGPT) 02/14/2019 17    • Alkaline Phosphatase 02/14/2019 110*   • Total Bilirubin 02/14/2019 0.3    • Albumin 02/14/2019 4.0    • Total Protein 02/14/2019 8.2    • Globulin 02/14/2019 4.2*   • A-G Ratio 02/14/2019 1.0    • Cholesterol,Tot 02/14/2019 135    • Triglycerides 02/14/2019 111    • HDL 02/14/2019 43    • LDL 02/14/2019 70    • Glycohemoglobin 02/14/2019 5.9*   • Est Avg Glucose  02/14/2019 123    • Creatinine, Urine 02/14/2019 119.70    • Microalbumin, Urine Rand* 02/14/2019 <0.7    • Micro Alb Creat Ratio 02/14/2019 see below    • GFR If  02/14/2019 >60    • GFR If Non  Ameri* 02/14/2019 >60          clinical course has been stable    Past Medical History:   Diagnosis Date   • Hypertension 2014       No past surgical history on file.    No family history on file.    Patient has no known allergies.    Current Outpatient Medications Ordered in Epic   Medication Sig Dispense Refill   • metFORMIN (GLUCOPHAGE) 500 MG Tab TAKE 1 TABLET BY MOUTH daily 90 Tab 3   • rosuvastatin (CRESTOR) 10 MG Tab Take 1 Tab by mouth every evening. 90 Tab 1   • cyclobenzaprine (FLEXERIL) 10 MG Tab Take 1 Tab by mouth 3 times a day as needed. 30 Tab 0   • diclofenac EC (VOLTAREN) 75 MG Tablet Delayed Response Take 1 Tab by mouth 2 times a day. 30 Tab 0   • lisinopril-hydrochlorothiazide (PRINZIDE, ZESTORETIC) 20-12.5 MG per tablet TAKE 1 TABLET BY MOUTH ONCE DAILY 100 Tab 1   • methylPREDNISolone (MEDROL DOSEPAK) 4 MG Tablet Therapy Pack Take 1 Tab by mouth See Admin Instructions. 21 Tab 0     No current Epic-ordered facility-administered medications on file.        Constitutional ROS: No unexpected change in weight, No weakness, No unexplained fevers, sweats, or chills  Pulmonary ROS: No chronic cough, sputum, or hemoptysis, No shortness of breath, No recent change in breathing  Cardiovascular ROS: No chest pain, No edema, No palpitations, Positive for hypertension   Gastrointestinal ROS: No abdominal pain, No nausea, vomiting, diarrhea, or constipation  Musculoskeletal/Extremities ROS: No clubbing, No peripheral edema, No pain, redness or swelling on the joints  Neurologic ROS: Normal development, No seizures, No weakness  Endocrine ROS: Positive per HPI    Physical exam:  /82 (BP Location: Right arm, Patient Position: Sitting, BP Cuff Size: Adult long)   Pulse 80   Temp 36.7 °C  "(98.1 °F) (Temporal)   Resp 16   Ht 1.6 m (5' 3\")   Wt 90.4 kg (199 lb 3.2 oz)   LMP 08/25/2019   SpO2 95%   BMI 35.29 kg/m²   General Appearance: alert, no distress, obese, well-groomed  Skin: Skin color, texture, turgor normal. No rashes or lesions.  Lungs: negative findings: normal respiratory rate and rhythm, lungs clear to auscultation  Heart: negative. RRR without murmur, gallop, or rubs.  No ectopy.  Abdomen: Abdomen soft, non-tender. BS normal. No masses,  No organomegaly  Musculoskeletal: negative findings: no evidence of joint instability, no evidence of muscle atrophy, no deformities present  Neurologic: intact, CN II through XII grossly intact  Diabetic Foot Exam: No ulcers, erythema or skin lesions present, patient  found to be sensitive bilaterally throughout the ball of the foot, great toe and heel.      Medical decision making/discussion: Patient will continue on current dose of metformin, she does agree to starting low-dose statin as mentioned above.  She will follow-up with me in March 2020 with labs done before visit.  She is going to continue with lifestyle modifications including healthy diet, regular exercise and efforts towards weight loss.    Avril was seen today for diabetes.    Diagnoses and all orders for this visit:    Type 2 diabetes mellitus without complication, without long-term current use of insulin ([facility])  -     POCT Hemoglobin A1C  -     Diabetic Monofilament Lower Extremity Exam  -     metFORMIN (GLUCOPHAGE) 500 MG Tab; TAKE 1 TABLET BY MOUTH daily  -     Comp Metabolic Panel; Future  -     HEMOGLOBIN A1C; Future  -     Lipid Profile; Future  -     MICROALBUMIN CREAT RATIO URINE; Future  -     rosuvastatin (CRESTOR) 10 MG Tab; Take 1 Tab by mouth every evening.    Hypertension, unspecified type  -     Comp Metabolic Panel; Future  -     Lipid Profile; Future        Return in about 6 months (around 3/26/2020) for Follow-up, Discuss Labs.        Please note that this dictation " was created using voice recognition software. I have made every reasonable attempt to correct obvious errors, but I expect that there are errors of grammar and possibly content that I did not discover before finalizing the note.

## 2019-09-30 NOTE — ASSESSMENT & PLAN NOTE
This is a chronic condition, stable and very well controlled on metformin 500 mg daily, A1c is 5.9%.  She will continue on this current dose for 6 more months and we will determine if she can completely come off of the medication.  She has been working very hard on lifestyle modifications, she is congratulated for this.  She does agree to starting statin especially in the setting of her type 2 diabetes.  She is due for monofilament exam, this is completed today.  She is going to follow-up with me in 6 months with labs done before visit.

## 2019-09-30 NOTE — ASSESSMENT & PLAN NOTE
This is a chronic condition, stable and very well controlled on current medication including lisinopril hydrochlorothiazide.  Blood pressure today within normal limits and she denies symptoms of hypertension.  She is up-to-date with labs, does not need refills at this time.

## 2019-10-28 NOTE — PATIENT INSTRUCTIONS
Follow up with me in 3 months with labs before you see me in 3 months   [FreeTextEntry1] : LIVIER PATTEN is a 25 year old woman who presents with solitary cotton wool spot in L eye x 1 month. Spontaneous onset on 9/22/19, which manifested as crescent area of bright light in lower visual field, followed by migraine the next day which resolved but the visual disturbance persisted. Does not have hx of chronic migraines. Saw optho and GP on 9/23/19 and had b/w, f/u with optho again on 10/3/19 for angiogram which was unrevealing, not given any eye drops, next f/u on 11/7. Over same time course has had nonproductive cough, fullness in ears, sinus congestion, sore throat but no F/C or purulent discharge. Initially taking claritin, given medrol dose pack on 10/5 without improvement, followed by cedinir x 7days without improvement. \par \par No chronic sinus issues, no epistaxis, no ocular pressure, no scalp tenderness, no rashes, no dysuria, hematuria, decreased urine, unintentional weight loss approx 8lbs over ?9 months, no change in appetite, No paresthesias, no muscle weakness, F/C, no night sweats. No CP/SOB since cough improved, green sputum at the time. No sick contacts. \par \par SLE ROS negative for alopecia, sicca, salivary gland swelling, oral ulcers, malar rash, photosensitivity, SOB, chest pain, serositis, abd pain, dysuria, hematuria, rash, joint AM stiffness/synovitis, hematologic abnormalities, Raynauds. No pregnancies, no blood clots. \par \par Chronic easy bruising, no blood clots, breakthru bleeding in sept, no change to OCPs, LMP otherwise regular. 2014 -- PPD +, CXR normal, INH x 9 months completed for LTBI -- [place] the [place], no awareness of exposure. Reports most recent tattoo shortly before onset of symptoms, not infected, healed well. \par \par Labs: LINDA/SLE w/u negative

## 2019-11-21 DIAGNOSIS — I10 HYPERTENSION, UNSPECIFIED TYPE: ICD-10-CM

## 2019-11-22 RX ORDER — LISINOPRIL AND HYDROCHLOROTHIAZIDE 20; 12.5 MG/1; MG/1
TABLET ORAL
Qty: 100 TAB | Refills: 1 | Status: SHIPPED | OUTPATIENT
Start: 2019-11-22 | End: 2020-06-17 | Stop reason: SDUPTHER

## 2019-11-22 NOTE — TELEPHONE ENCOUNTER
Was the patient seen in the last year in this department? Yes    Does patient have an active prescription for medications requested? No     Received Request Via: Pharmacy      Pt met protocol?: Yes    OV 9/19     BP Readings from Last 1 Encounters:   09/26/19 116/82

## 2019-12-02 ENCOUNTER — PATIENT MESSAGE (OUTPATIENT)
Dept: HEALTH INFORMATION MANAGEMENT | Facility: OTHER | Age: 39
End: 2019-12-02

## 2019-12-16 ENCOUNTER — TELEPHONE (OUTPATIENT)
Dept: MEDICAL GROUP | Facility: PHYSICIAN GROUP | Age: 39
End: 2019-12-16

## 2019-12-16 NOTE — TELEPHONE ENCOUNTER
Pt called and stated she needed a refill of her BP Meds.    Patient informed she has a refill at [facility]      I have called [facility] to verify this is available for .     No action needed

## 2020-02-13 DIAGNOSIS — E11.9 TYPE 2 DIABETES MELLITUS WITHOUT COMPLICATION, WITHOUT LONG-TERM CURRENT USE OF INSULIN (HCC): ICD-10-CM

## 2020-06-17 DIAGNOSIS — I10 HYPERTENSION, UNSPECIFIED TYPE: ICD-10-CM

## 2020-06-17 RX ORDER — LISINOPRIL AND HYDROCHLOROTHIAZIDE 20; 12.5 MG/1; MG/1
TABLET ORAL
Qty: 100 TAB | Refills: 1 | Status: SHIPPED | OUTPATIENT
Start: 2020-06-17 | End: 2020-12-23 | Stop reason: SDUPTHER

## 2020-11-21 DIAGNOSIS — E11.9 TYPE 2 DIABETES MELLITUS WITHOUT COMPLICATION, WITHOUT LONG-TERM CURRENT USE OF INSULIN (HCC): ICD-10-CM

## 2020-12-01 DIAGNOSIS — I10 HYPERTENSION, UNSPECIFIED TYPE: ICD-10-CM

## 2020-12-01 DIAGNOSIS — E11.9 TYPE 2 DIABETES MELLITUS WITHOUT COMPLICATION, WITHOUT LONG-TERM CURRENT USE OF INSULIN (HCC): ICD-10-CM

## 2020-12-01 NOTE — TELEPHONE ENCOUNTER
Will fill for 30 days, have not seen in over a year and she has not done her labs--I have ordered labs

## 2020-12-02 DIAGNOSIS — E11.9 TYPE 2 DIABETES MELLITUS WITHOUT COMPLICATION, WITHOUT LONG-TERM CURRENT USE OF INSULIN (HCC): ICD-10-CM

## 2020-12-04 NOTE — TELEPHONE ENCOUNTER
Last seen by PCP 09/26/2019.     Lab Results   Component Value Date/Time    HBA1C 5.9 (A) 09/26/2019 04:38 PM      Lab Results   Component Value Date/Time    MALBCRT see below 02/14/2019 09:41 AM    MICROALBUR <0.7 02/14/2019 09:41 AM      Lab Results   Component Value Date/Time    ALKPHOSPHAT 110 (H) 02/14/2019 09:41 AM    ASTSGOT 19 02/14/2019 09:41 AM    ALTSGPT 17 02/14/2019 09:41 AM    TBILIRUBIN 0.3 02/14/2019 09:41 AM         Pt to make appt prior to more refills.

## 2020-12-23 DIAGNOSIS — I10 HYPERTENSION, UNSPECIFIED TYPE: ICD-10-CM

## 2020-12-23 DIAGNOSIS — E11.9 TYPE 2 DIABETES MELLITUS WITHOUT COMPLICATION, WITHOUT LONG-TERM CURRENT USE OF INSULIN (HCC): ICD-10-CM

## 2020-12-23 RX ORDER — LISINOPRIL AND HYDROCHLOROTHIAZIDE 20; 12.5 MG/1; MG/1
TABLET ORAL
Qty: 100 TAB | Refills: 0 | Status: SHIPPED | OUTPATIENT
Start: 2020-12-23 | End: 2021-04-07

## 2020-12-23 NOTE — TELEPHONE ENCOUNTER
Pt is calling because she has one week left of BP meds but cannot come in for a visit because she is sick. Can you send her in a tem refill?    Received request via: Patient    Was the patient seen in the last year in this department? No     Does the patient have an active prescription (recently filled or refills available) for medication(s) requested? No

## 2021-01-27 DIAGNOSIS — E11.9 TYPE 2 DIABETES MELLITUS WITHOUT COMPLICATION, WITHOUT LONG-TERM CURRENT USE OF INSULIN (HCC): ICD-10-CM

## 2021-03-01 ENCOUNTER — TELEPHONE (OUTPATIENT)
Dept: MEDICAL GROUP | Facility: PHYSICIAN GROUP | Age: 41
End: 2021-03-01

## 2021-03-02 ENCOUNTER — HOSPITAL ENCOUNTER (OUTPATIENT)
Dept: LAB | Facility: MEDICAL CENTER | Age: 41
End: 2021-03-02
Attending: NURSE PRACTITIONER
Payer: COMMERCIAL

## 2021-03-02 DIAGNOSIS — I10 HYPERTENSION, UNSPECIFIED TYPE: ICD-10-CM

## 2021-03-02 DIAGNOSIS — E11.9 TYPE 2 DIABETES MELLITUS WITHOUT COMPLICATION, WITHOUT LONG-TERM CURRENT USE OF INSULIN (HCC): ICD-10-CM

## 2021-03-02 PROCEDURE — 83036 HEMOGLOBIN GLYCOSYLATED A1C: CPT

## 2021-03-02 PROCEDURE — 85025 COMPLETE CBC W/AUTO DIFF WBC: CPT

## 2021-03-02 PROCEDURE — 82570 ASSAY OF URINE CREATININE: CPT

## 2021-03-02 PROCEDURE — 80061 LIPID PANEL: CPT

## 2021-03-02 PROCEDURE — 82043 UR ALBUMIN QUANTITATIVE: CPT

## 2021-03-02 PROCEDURE — 36415 COLL VENOUS BLD VENIPUNCTURE: CPT

## 2021-03-02 PROCEDURE — 80053 COMPREHEN METABOLIC PANEL: CPT

## 2021-03-02 NOTE — TELEPHONE ENCOUNTER
Patient called for a refill of metformin. I have informed patient she needs labs since her last labs were 2019.    Patient stated she will get labs done in to morning

## 2021-03-03 LAB
ALBUMIN SERPL BCP-MCNC: 3.5 G/DL (ref 3.2–4.9)
ALBUMIN/GLOB SERPL: 1 G/DL
ALP SERPL-CCNC: 107 U/L (ref 30–99)
ALT SERPL-CCNC: 14 U/L (ref 2–50)
ANION GAP SERPL CALC-SCNC: 7 MMOL/L (ref 7–16)
AST SERPL-CCNC: 17 U/L (ref 12–45)
BASOPHILS # BLD AUTO: 0.3 % (ref 0–1.8)
BASOPHILS # BLD: 0.02 K/UL (ref 0–0.12)
BILIRUB SERPL-MCNC: 0.3 MG/DL (ref 0.1–1.5)
BUN SERPL-MCNC: 9 MG/DL (ref 8–22)
CALCIUM SERPL-MCNC: 8.8 MG/DL (ref 8.5–10.5)
CHLORIDE SERPL-SCNC: 100 MMOL/L (ref 96–112)
CHOLEST SERPL-MCNC: 142 MG/DL (ref 100–199)
CO2 SERPL-SCNC: 28 MMOL/L (ref 20–33)
CREAT SERPL-MCNC: 0.54 MG/DL (ref 0.5–1.4)
CREAT UR-MCNC: 136.71 MG/DL
EOSINOPHIL # BLD AUTO: 0.1 K/UL (ref 0–0.51)
EOSINOPHIL NFR BLD: 1.5 % (ref 0–6.9)
ERYTHROCYTE [DISTWIDTH] IN BLOOD BY AUTOMATED COUNT: 48.9 FL (ref 35.9–50)
EST. AVERAGE GLUCOSE BLD GHB EST-MCNC: 120 MG/DL
FASTING STATUS PATIENT QL REPORTED: NORMAL
GLOBULIN SER CALC-MCNC: 3.6 G/DL (ref 1.9–3.5)
GLUCOSE SERPL-MCNC: 95 MG/DL (ref 65–99)
HBA1C MFR BLD: 5.8 % (ref 4–5.6)
HCT VFR BLD AUTO: 38.6 % (ref 37–47)
HDLC SERPL-MCNC: 43 MG/DL
HGB BLD-MCNC: 11.9 G/DL (ref 12–16)
IMM GRANULOCYTES # BLD AUTO: 0.02 K/UL (ref 0–0.11)
IMM GRANULOCYTES NFR BLD AUTO: 0.3 % (ref 0–0.9)
LDLC SERPL CALC-MCNC: 77 MG/DL
LYMPHOCYTES # BLD AUTO: 2.44 K/UL (ref 1–4.8)
LYMPHOCYTES NFR BLD: 35.4 % (ref 22–41)
MCH RBC QN AUTO: 27 PG (ref 27–33)
MCHC RBC AUTO-ENTMCNC: 30.8 G/DL (ref 33.6–35)
MCV RBC AUTO: 87.5 FL (ref 81.4–97.8)
MICROALBUMIN UR-MCNC: <1.2 MG/DL
MICROALBUMIN/CREAT UR: NORMAL MG/G (ref 0–30)
MONOCYTES # BLD AUTO: 0.39 K/UL (ref 0–0.85)
MONOCYTES NFR BLD AUTO: 5.7 % (ref 0–13.4)
NEUTROPHILS # BLD AUTO: 3.92 K/UL (ref 2–7.15)
NEUTROPHILS NFR BLD: 56.8 % (ref 44–72)
NRBC # BLD AUTO: 0 K/UL
NRBC BLD-RTO: 0 /100 WBC
PLATELET # BLD AUTO: 209 K/UL (ref 164–446)
PMV BLD AUTO: 11.9 FL (ref 9–12.9)
POTASSIUM SERPL-SCNC: 3.6 MMOL/L (ref 3.6–5.5)
PROT SERPL-MCNC: 7.1 G/DL (ref 6–8.2)
RBC # BLD AUTO: 4.41 M/UL (ref 4.2–5.4)
SODIUM SERPL-SCNC: 135 MMOL/L (ref 135–145)
TRIGL SERPL-MCNC: 110 MG/DL (ref 0–149)
WBC # BLD AUTO: 6.9 K/UL (ref 4.8–10.8)

## 2021-03-04 ENCOUNTER — OFFICE VISIT (OUTPATIENT)
Dept: MEDICAL GROUP | Facility: PHYSICIAN GROUP | Age: 41
End: 2021-03-04
Payer: COMMERCIAL

## 2021-03-04 VITALS
HEART RATE: 79 BPM | BODY MASS INDEX: 35.44 KG/M2 | WEIGHT: 200 LBS | DIASTOLIC BLOOD PRESSURE: 80 MMHG | RESPIRATION RATE: 14 BRPM | SYSTOLIC BLOOD PRESSURE: 134 MMHG | HEIGHT: 63 IN | TEMPERATURE: 98.7 F | OXYGEN SATURATION: 96 %

## 2021-03-04 DIAGNOSIS — Z12.31 ENCOUNTER FOR SCREENING MAMMOGRAM FOR MALIGNANT NEOPLASM OF BREAST: ICD-10-CM

## 2021-03-04 DIAGNOSIS — E11.9 TYPE 2 DIABETES MELLITUS WITHOUT COMPLICATION, WITHOUT LONG-TERM CURRENT USE OF INSULIN (HCC): ICD-10-CM

## 2021-03-04 PROCEDURE — 99213 OFFICE O/P EST LOW 20 MIN: CPT | Performed by: NURSE PRACTITIONER

## 2021-03-04 ASSESSMENT — PATIENT HEALTH QUESTIONNAIRE - PHQ9: CLINICAL INTERPRETATION OF PHQ2 SCORE: 0

## 2021-03-04 ASSESSMENT — FIBROSIS 4 INDEX: FIB4 SCORE: 0.91

## 2021-03-04 NOTE — PROGRESS NOTES
Chief Complaint   Patient presents with   • Diabetes   • Medication Refill       HISTORY OF PRESENT ILLNESS: Patient is a 40 y.o. female established patient who presents today to discuss diabetes, lab review, medication refill    Type 2 diabetes mellitus without complication, without long-term current use of insulin (CMS-HCC) ([facility])  This is a chronic condition, stable under excellent control on Metformin 500 mg daily  A1c is 5.8%  She does need refills  Will be due for repeat labs again in 6 months  Appropriately on ACE inhibitor but not taking statin due to allergy however lipids are well within normal limits, LDL in the 70s  Discussed the importance of ongoing healthy lifestyle modifications      Patient Active Problem List    Diagnosis Date Noted   • Obesity (BMI 30-39.9) 2019   • Elevated liver function tests 2019   • Type 2 diabetes mellitus without complication, without long-term current use of insulin ([facility]) 2018   • Menorrhagia with irregular cycle 2018   • Hypertension 10/03/2017   • Healthcare maintenance 10/03/2017   • Obesity (BMI 35.0-39.9 without comorbidity) ([facility]) 10/03/2017       Allergies:Rosuvastatin    Current Outpatient Medications   Medication Sig Dispense Refill   • metFORMIN (GLUCOPHAGE) 500 MG Tab TAKE 1 TABLET BY MOUTH ONCE DAILY (PT  MUST  HAVE  LABS  AND  BE  SEEN  FOR  REFILLS) 90 tablet 3   • lisinopril-hydrochlorothiazide (PRINZIDE) 20-12.5 MG per tablet TAKE 1 TABLET BY MOUTH ONCE DAILY 100 Tab 0     No current facility-administered medications for this visit.       Social History     Tobacco Use   • Smoking status: Former Smoker     Types: Cigarettes     Quit date: 10/3/2015     Years since quittin.4   • Smokeless tobacco: Former User     Quit date: 10/3/2015   Substance Use Topics   • Alcohol use: Yes     Comment: occ   • Drug use: No       No family status information on file.   No family history on file.    Review of Systems:   Constitutional: Negative  "for fever, chills, weight loss and malaise/fatigue.   Respiratory: Negative for cough, sputum production, shortness of breath and wheezing.    Cardiovascular: Negative for chest pain, palpitations, orthopnea and leg swelling.   Gastrointestinal: Negative for heartburn, nausea, vomiting and abdominal pain.   Genitourinary/Renal: Negative for dysuria, urgency and frequency.   Musculoskeletal: Negative for myalgias, back pain and joint pain.   Skin: Negative for rash and itching.   Neurological: Negative for dizziness, tingling, tremors, sensory change, focal weakness and headaches.   Endo/Heme/Allergies: positive per HPI    Exam:  /80   Pulse 79   Temp 37.1 °C (98.7 °F) (Temporal)   Resp 14   Ht 1.6 m (5' 3\")   Wt 90.7 kg (200 lb)   SpO2 96%   General:  Well nourished, well developed female in NAD  Skin: warm, dry, intact, no evidence of rash or concerning lesions  Head: is grossly normal.  HEENT: eyes clear, conjunctiva normal, PERRLA  Pulmonary: Clear to ausculation. Normal effort. No rales, ronchi, or wheezing.  Cardiovascular: Regular rate and rhythm without murmur. Carotid and radial pulses are intact and equal bilaterally.  Abdomen: soft, non-tender, positive bowel sounds  Musculoskeletal: no clubbing, cyanosis, or edema.  Psych/mental: no depression, anxiety, hallucinations  Neuro: alert, intact, CN 2-12 grossly intact  Diabetic Foot Exam: No ulcers, erythema or skin lesions present, patient tested with monofilament (10g) and tuning fork found to be sensitive bilaterally throughout the ball of the foot, great toe and heel.      Medical decision-making and discussion:    As mentioned above, patient continue with her healthy lifestyle modifications of medications as prescribed, Metformin refilled.  She will be due for follow-up labs again in 6 months.  Due for mammogram, this has been ordered        Assessment/Plan:  Avril was seen today for diabetes and medication refill.    Diagnoses and all orders " for this visit:    Type 2 diabetes mellitus without complication, without long-term current use of insulin (HCC)  -     metFORMIN (GLUCOPHAGE) 500 MG Tab; TAKE 1 TABLET BY MOUTH ONCE DAILY (PT  MUST  HAVE  LABS  AND  BE  SEEN  FOR  REFILLS)  -     POCT Retinal Eye Exam  -     Diabetic Monofilament Lower Extremity Exam  -     HEMOGLOBIN A1C; Future    Encounter for screening mammogram for malignant neoplasm of breast  -     MA-SCREENING MAMMO BILAT W/CAD; Future         Return in about 6 months (around 9/4/2021) for Follow-up, Discuss Labs.    Please note that this dictation was created using voice recognition software. I have made every reasonable attempt to correct obvious errors, but I expect that there are errors of grammar and possibly content that I did not discover before finalizing the note.

## 2021-03-04 NOTE — ASSESSMENT & PLAN NOTE
This is a chronic condition, stable under excellent control on Metformin 500 mg daily  A1c is 5.8%  She does need refills  Will be due for repeat labs again in 6 months  Appropriately on ACE inhibitor but not taking statin due to allergy however lipids are well within normal limits, LDL in the 70s  Discussed the importance of ongoing healthy lifestyle modifications

## 2021-04-06 DIAGNOSIS — I10 HYPERTENSION, UNSPECIFIED TYPE: ICD-10-CM

## 2021-04-07 RX ORDER — LISINOPRIL AND HYDROCHLOROTHIAZIDE 20; 12.5 MG/1; MG/1
TABLET ORAL
Qty: 100 TABLET | Refills: 1 | Status: SHIPPED | OUTPATIENT
Start: 2021-04-07 | End: 2021-10-28 | Stop reason: SDUPTHER

## 2021-10-28 DIAGNOSIS — I10 HYPERTENSION, UNSPECIFIED TYPE: ICD-10-CM

## 2021-10-28 RX ORDER — LISINOPRIL AND HYDROCHLOROTHIAZIDE 20; 12.5 MG/1; MG/1
1 TABLET ORAL DAILY
Qty: 100 TABLET | Refills: 1 | Status: SHIPPED | OUTPATIENT
Start: 2021-10-28 | End: 2022-05-09 | Stop reason: SDUPTHER

## 2021-10-28 NOTE — TELEPHONE ENCOUNTER
Received request via: Pharmacy    Was the patient seen in the last year in this department? Yes    Does the patient have an active prescription (recently filled or refills available) for medication(s) requested? No    Requested Prescriptions     Pending Prescriptions Disp Refills   • lisinopril-hydrochlorothiazide (PRINZIDE) 20-12.5 MG per tablet 100 Tablet 1     Sig: Take 1 Tablet by mouth every day.

## 2022-05-09 DIAGNOSIS — I10 HYPERTENSION, UNSPECIFIED TYPE: ICD-10-CM

## 2022-05-09 RX ORDER — LISINOPRIL AND HYDROCHLOROTHIAZIDE 20; 12.5 MG/1; MG/1
1 TABLET ORAL DAILY
Qty: 100 TABLET | Refills: 0 | Status: SHIPPED | OUTPATIENT
Start: 2022-05-09 | End: 2022-08-03 | Stop reason: SDUPTHER

## 2022-05-09 NOTE — TELEPHONE ENCOUNTER
Please call patient and schedule an establish care visit with a provider.  No further refills until he is established with new provider.

## 2022-05-09 NOTE — TELEPHONE ENCOUNTER
Received request via: Pharmacy    Was the patient seen in the last year in this department? No    Does the patient have an active prescription (recently filled or refills available) for medication(s) requested? No     Last ov 3/4/21

## 2022-08-03 ENCOUNTER — HOSPITAL ENCOUNTER (OUTPATIENT)
Facility: MEDICAL CENTER | Age: 42
End: 2022-08-03
Attending: NURSE PRACTITIONER
Payer: COMMERCIAL

## 2022-08-03 ENCOUNTER — OFFICE VISIT (OUTPATIENT)
Dept: MEDICAL GROUP | Facility: PHYSICIAN GROUP | Age: 42
End: 2022-08-03
Payer: COMMERCIAL

## 2022-08-03 VITALS
DIASTOLIC BLOOD PRESSURE: 82 MMHG | SYSTOLIC BLOOD PRESSURE: 118 MMHG | WEIGHT: 201 LBS | TEMPERATURE: 96.5 F | RESPIRATION RATE: 16 BRPM | HEIGHT: 64 IN | HEART RATE: 67 BPM | BODY MASS INDEX: 34.31 KG/M2 | OXYGEN SATURATION: 99 %

## 2022-08-03 DIAGNOSIS — I10 HYPERTENSION, UNSPECIFIED TYPE: ICD-10-CM

## 2022-08-03 DIAGNOSIS — Z12.31 ENCOUNTER FOR SCREENING MAMMOGRAM FOR BREAST CANCER: ICD-10-CM

## 2022-08-03 DIAGNOSIS — Z76.89 ENCOUNTER TO ESTABLISH CARE: ICD-10-CM

## 2022-08-03 DIAGNOSIS — E66.09 CLASS 1 OBESITY DUE TO EXCESS CALORIES WITH SERIOUS COMORBIDITY AND BODY MASS INDEX (BMI) OF 34.0 TO 34.9 IN ADULT: ICD-10-CM

## 2022-08-03 DIAGNOSIS — E11.9 TYPE 2 DIABETES MELLITUS WITHOUT COMPLICATION, WITHOUT LONG-TERM CURRENT USE OF INSULIN (HCC): ICD-10-CM

## 2022-08-03 PROBLEM — E66.9 OBESITY (BMI 30-39.9): Status: RESOLVED | Noted: 2019-08-21 | Resolved: 2022-08-03

## 2022-08-03 PROBLEM — E66.811 CLASS 1 OBESITY DUE TO EXCESS CALORIES WITH SERIOUS COMORBIDITY AND BODY MASS INDEX (BMI) OF 34.0 TO 34.9 IN ADULT: Status: ACTIVE | Noted: 2017-10-03

## 2022-08-03 PROCEDURE — 82043 UR ALBUMIN QUANTITATIVE: CPT

## 2022-08-03 PROCEDURE — 92250 FUNDUS PHOTOGRAPHY W/I&R: CPT | Mod: TC | Performed by: NURSE PRACTITIONER

## 2022-08-03 PROCEDURE — 99214 OFFICE O/P EST MOD 30 MIN: CPT | Performed by: NURSE PRACTITIONER

## 2022-08-03 PROCEDURE — 82570 ASSAY OF URINE CREATININE: CPT

## 2022-08-03 RX ORDER — LISINOPRIL AND HYDROCHLOROTHIAZIDE 20; 12.5 MG/1; MG/1
1 TABLET ORAL DAILY
Qty: 90 TABLET | Refills: 0 | Status: SHIPPED | OUTPATIENT
Start: 2022-08-03 | End: 2022-11-22

## 2022-08-03 ASSESSMENT — FIBROSIS 4 INDEX: FIB4 SCORE: 0.93

## 2022-08-03 NOTE — ASSESSMENT & PLAN NOTE
"Vitals 9/26/2019 3/4/2021 8/3/2022   WEIGHT 199.2 200 201   HEIGHT 5' 3\" 5' 3\" 5' 4\"   BMI 35.29 kg/m2 35.43 kg/m2 34.5 kg/m2   Chronic ongoing health concern.  Patient does identify with her obesity.  She does report she works on eating healthy diet however continues to struggle with regular physical exercise.  "

## 2022-08-03 NOTE — PATIENT INSTRUCTIONS
Diabetes Mellitus and Foot Care  Foot care is an important part of your health, especially when you have diabetes. Diabetes may cause you to have problems because of poor blood flow (circulation) to your feet and legs, which can cause your skin to:  Become thinner and drier.  Break more easily.  Heal more slowly.  Peel and crack.  You may also have nerve damage (neuropathy) in your legs and feet, causing decreased feeling in them. This means that you may not notice minor injuries to your feet that could lead to more serious problems. Noticing and addressing any potential problems early is the best way to prevent future foot problems.  How to care for your feet  Foot hygiene  Wash your feet daily with warm water and mild soap. Do not use hot water. Then, pat your feet and the areas between your toes until they are completely dry. Do not soak your feet as this can dry your skin.  Trim your toenails straight across. Do not dig under them or around the cuticle. File the edges of your nails with an emery board or nail file.  Apply a moisturizing lotion or petroleum jelly to the skin on your feet and to dry, brittle toenails. Use lotion that does not contain alcohol and is unscented. Do not apply lotion between your toes.  Shoes and socks  Wear clean socks or stockings every day. Make sure they are not too tight. Do not wear knee-high stockings since they may decrease blood flow to your legs.  Wear shoes that fit properly and have enough cushioning. Always look in your shoes before you put them on to be sure there are no objects inside.  To break in new shoes, wear them for just a few hours a day. This prevents injuries on your feet.  Wounds, scrapes, corns, and calluses  Check your feet daily for blisters, cuts, bruises, sores, and redness. If you cannot see the bottom of your feet, use a mirror or ask someone for help.  Do not cut corns or calluses or try to remove them with medicine.  If you find a minor scrape, cut,  or break in the skin on your feet, keep it and the skin around it clean and dry. You may clean these areas with mild soap and water. Do not clean the area with peroxide, alcohol, or iodine.  If you have a wound, scrape, corn, or callus on your foot, look at it several times a day to make sure it is healing and not infected. Check for:  Redness, swelling, or pain.  Fluid or blood.  Warmth.  Pus or a bad smell.  General instructions  Do not cross your legs. This may decrease blood flow to your feet.  Do not use heating pads or hot water bottles on your feet. They may burn your skin. If you have lost feeling in your feet or legs, you may not know this is happening until it is too late.  Protect your feet from hot and cold by wearing shoes, such as at the beach or on hot pavement.  Schedule a complete foot exam at least once a year (annually) or more often if you have foot problems. If you have foot problems, report any cuts, sores, or bruises to your health care provider immediately.  Contact a health care provider if:  You have a medical condition that increases your risk of infection and you have any cuts, sores, or bruises on your feet.  You have an injury that is not healing.  You have redness on your legs or feet.  You feel burning or tingling in your legs or feet.  You have pain or cramps in your legs and feet.  Your legs or feet are numb.  Your feet always feel cold.  You have pain around a toenail.  Get help right away if:  You have a wound, scrape, corn, or callus on your foot and:  You have pain, swelling, or redness that gets worse.  You have fluid or blood coming froDiabetes Basics    Diabetes (diabetes mellitus) is a long-term (chronic) disease. It occurs when the body does not properly use sugar (glucose) that is released from food after you eat.  Diabetes may be caused by one or both of these problems:  Your pancreas does not make enough of a hormone called insulin.  Your body does not react in a normal  way to insulin that it makes.  Insulin lets sugars (glucose) go into cells in your body. This gives you energy. If you have diabetes, sugars cannot get into cells. This causes high blood sugar (hyperglycemia).  Follow these instructions at home:  How is diabetes treated?  You may need to take insulin or other diabetes medicines daily to keep your blood sugar in balance. Take your diabetes medicines every day as told by your doctor. List your diabetes medicines here:  Diabetes medicines  Name of medicine: ______________________________  Amount (dose): _______________ Time (a.m./p.m.): _______________ Notes: ___________________________________  Name of medicine: ______________________________  Amount (dose): _______________ Time (a.m./p.m.): _______________ Notes: ___________________________________  Name of medicine: ______________________________  Amount (dose): _______________ Time (a.m./p.m.): _______________ Notes: ___________________________________  If you use insulin, you will learn how to give yourself insulin by injection. You may need to adjust the amount based on the food that you eat. List the types of insulin you use here:  Insulin  Insulin type: ______________________________  Amount (dose): _______________ Time (a.m./p.m.): _______________ Notes: ___________________________________  Insulin type: ______________________________  Amount (dose): _______________ Time (a.m./p.m.): _______________ Notes: ___________________________________  Insulin type: ______________________________  Amount (dose): _______________ Time (a.m./p.m.): _______________ Notes: ___________________________________  Insulin type: ______________________________  Amount (dose): _______________ Time (a.m./p.m.): _______________ Notes: ___________________________________  Insulin type: ______________________________  Amount (dose): _______________ Time (a.m./p.m.): _______________ Notes: ___________________________________  How do I  manage my blood sugar?    Check your blood sugar levels using a blood glucose monitor as directed by your doctor.  Your doctor will set treatment goals for you. Generally, you should have these blood sugar levels:  Before meals (preprandial):  mg/dL (4.4-7.2 mmol/L).  After meals (postprandial): below 180 mg/dL (10 mmol/L).  A1c level: less than 7%.  Write down the times that you will check your blood sugar levels:  Blood sugar checks  Time: _______________ Notes: ___________________________________  Time: _______________ Notes: ___________________________________  Time: _______________ Notes: ___________________________________  Time: _______________ Notes: ___________________________________  Time: _______________ Notes: ___________________________________  Time: _______________ Notes: ___________________________________    What do I need to know about low blood sugar?  Low blood sugar is called hypoglycemia. This is when blood sugar is at or below 70 mg/dL (3.9 mmol/L). Symptoms may include:  Feeling:  Hungry.  Worried or nervous (anxious).  Sweaty and clammy.  Confused.  Dizzy.  Sleepy.  Sick to your stomach (nauseous).  Having:  A fast heartbeat.  A headache.  A change in your vision.  Tingling or no feeling (numbness) around the mouth, lips, or tongue.  Jerky movements that you cannot control (seizure).  Having trouble with:  Moving (coordination).  Sleeping.  Passing out (fainting).  Getting upset easily (irritability).  Treating low blood sugar  To treat low blood sugar, eat or drink something sugary right away. If you can think clearly and swallow safely, follow the 15:15 rule:  Take 15 grams of a fast-acting carb (carbohydrate). Talk with your doctor about how much you should take.  Some fast-acting carbs are:  Sugar tablets (glucose pills). Take 3-4 glucose pills.  6-8 pieces of hard candy.  4-6 oz (120-150 mL) of fruit juice.  4-6 oz (120-150 mL) of regular (not diet) soda.  1 Tbsp (15 mL) honey or  sugar.  Check your blood sugar 15 minutes after you take the carb.  If your blood sugar is still at or below 70 mg/dL (3.9 mmol/L), take 15 grams of a carb again.  If your blood sugar does not go above 70 mg/dL (3.9 mmol/L) after 3 tries, get help right away.  After your blood sugar goes back to normal, eat a meal or a snack within 1 hour.  Treating very low blood sugar  If your blood sugar is at or below 54 mg/dL (3 mmol/L), you have very low blood sugar (severe hypoglycemia). This is an emergency. Do not wait to see if the symptoms will go away. Get medical help right away. Call your local emergency services (911 in the [place] Do not drive yourself to the hospital.  Questions to ask your health care provider  Do I need to meet with a diabetes educator?  What equipment will I need to care for myself at home?  What diabetes medicines do I need? When should I take them?  How often do I need to check my blood sugar?  What number can I call if I have questions?  When is my next doctor's visit?  Where can I find a support group for people with diabetes?  Where to find more information  American Diabetes Association: www.diabetes.org  American Association of Diabetes Educators: www.diabeteseducator.org/patient-resources  Contact a doctor if:  Your blood sugar is at or above 240 mg/dL (13.3 mmol/L) for 2 days in a row.  You have been sick or have had a fever for 2 days or more, and you are not getting better.  You have any of these problems for more than 6 hours:  You cannot eat or drink.  You feel sick to your stomach (nauseous).  You throw up (vomit).  You have watery poop (diarrhea).  Get help right away if:  Your blood sugar is lower than 54 mg/dL (3 mmol/L).  You get confused.  You have trouble:  Thinking clearly.  Breathing.  Summary  Diabetes (diabetes mellitus) is a long-term (chronic) disease. It occurs when the body does not properly use sugar (glucose) that is released from food after digestion.  Take insulin  and diabetes medicines as told.  Check your blood sugar every day, as often as told.  Keep all follow-up visits as told by your doctor. This is important.  This information is not intended to replace advice given to you by your health care provider. Make sure you discuss any questions you have with your health care provider.  Document Released: 03/22/2019 Document Revised: 02/07/2020 Document Reviewed: 03/22/2019  [employer] Patient Education © 2020 [employer]  m the wound, scrape, corn, or callus.  Your wound, scrape, corn, or callus feels warm to the touch.  You have pus or a bad smell coming from the wound, scrape, corn, or callus.  You have a fever.  You have a red line going up your leg.  Summary  Check your feet every day for cuts, sores, red spots, swelling, and blisters.  Moisturize feet and legs daily.  Wear shoes that fit properly and have enough cushioning.  If you have foot problems, report any cuts, sores, or bruises to your health care provider immediately.  Schedule a complete foot exam at least once a year (annually) or more often if you have foot problems.  This information is not intended to replace advice given to you by your health care provider. Make sure you discuss any questions you have with your health care provider.  Document Released: 12/15/2001 Document Revised: 01/30/2019 Document Reviewed: 01/19/2018  [employer] Patient Education © 2020 [employer]

## 2022-08-03 NOTE — ASSESSMENT & PLAN NOTE
Chronic well-controlled condition.  Patient is currently on lisinopril/hydrochlorothiazide 20-12.5 mg 1 tablet daily.  Blood pressure today in clinic is 118/82.  Patient is overdue for labs.  Lab orders placed.

## 2022-08-03 NOTE — ASSESSMENT & PLAN NOTE
Chronic and ongoing health concern.  Patient reports that she has been out of metformin since May.  Patient is overdue for labs.    She denies any symptoms of polyuria, polydipsia, or polyphagia.  She does not follow-up with an ophthalmologist.  Probably on ACE inhibitor.  Previously did trial of Crestor however had sensitivity to it.  Previous lipid panel was within normal limits.  Due for labs.  Lab orders have been placed  Patient does report chronic dry and cracked feet without any redness, weeping, or pain.  Patient denies any symptoms of neuropathy.

## 2022-08-03 NOTE — PROGRESS NOTES
"Chief Complaint   Patient presents with   • Establish Care     N2U   • Medication Refill       Subjective:     HPI:   Avril Keller is a 41 y.o. female here to discuss the evaluation and management of:      Type 2 diabetes mellitus without complication, without long-term current use of insulin (CMS-HCC) (HCC)  Chronic and ongoing health concern.  Patient reports that she has been out of metformin since May.  Patient is overdue for labs.    She denies any symptoms of polyuria, polydipsia, or polyphagia.  She does not follow-up with an ophthalmologist.  Probably on ACE inhibitor.  Previously did trial of Crestor however had sensitivity to it.  Previous lipid panel was within normal limits.  Due for labs.  Lab orders have been placed  Patient does report chronic dry and cracked feet without any redness, weeping, or pain.  Patient denies any symptoms of neuropathy.    Hypertension  Chronic well-controlled condition.  Patient is currently on lisinopril/hydrochlorothiazide 20-12.5 mg 1 tablet daily.  Blood pressure today in clinic is 118/82.  Patient is overdue for labs.  Lab orders placed.    Class 1 obesity due to excess calories with serious comorbidity and body mass index (BMI) of 34.0 to 34.9 in adult  Vitals 9/26/2019 3/4/2021 8/3/2022   WEIGHT 199.2 200 201   HEIGHT 5' 3\" 5' 3\" 5' 4\"   BMI 35.29 kg/m2 35.43 kg/m2 34.5 kg/m2   Chronic ongoing health concern.  Patient does identify with her obesity.  She does report she works on eating healthy diet however continues to struggle with regular physical exercise.    Encounter to establish care  Very pleasant 41-year-old female presents today to establish care and is requesting refills of her blood pressure and diabetic medication.  I reviewed and updated her medical history, surgical history, family history, medications, allergies, social determinants of health, and problem list.    Patient is currently overdue for labs.  Lab orders have been placed as indicated " "below.    Reviewed care gaps with patient indicating she is due for multiple vaccines including COVID-vaccine and pneumonia vaccine.  Patient has declined all vaccines today and is aware the risks associated in doing so.    Patient is overdue on Pap and mammogram screening.  Patient is agreeable for some placed today for mammogram and will schedule Pap in clinic.            ROS:  Gen: no fevers/chills, no changes in weight  Pulm: no sob, no cough  CV: no chest pain, no palpitations  GI: no nausea/vomiting, no diarrhea  MSk: no myalgias  Neuro: no headaches, no numbness/tingling      Allergies   Allergen Reactions   • Rosuvastatin Unspecified     Flu like symptoms, Achy, Nausea, Diarrhea         Current medicines (including changes today)  Current Outpatient Medications   Medication Sig Dispense Refill   • metFORMIN (GLUCOPHAGE) 500 MG Tab TAKE 1 TABLET BY MOUTH ONCE DAILY 90 Tablet 0   • lisinopril-hydrochlorothiazide (PRINZIDE) 20-12.5 MG per tablet Take 1 Tablet by mouth every day. 90 Tablet 0     No current facility-administered medications for this visit.          Objective:       /82 (BP Location: Right arm, Patient Position: Sitting, BP Cuff Size: Adult)   Pulse 67   Temp 35.8 °C (96.5 °F) (Oral)   Resp 16   Ht 1.626 m (5' 4\")   Wt 91.2 kg (201 lb)   SpO2 99%  Body mass index is 34.5 kg/m².    Physical Exam:  Constitutional: Well-developed and well-nourished female in NAD. Not diaphoretic. No distress.   Skin: warm, dry, intact  Cardiovascular: Regular rate and rhythm without murmur. Radial pulses are intact and equal bilaterally.  Pulmonary: Clear to ausculation. Normal effort. No rales, ronchi, or wheezing.  Abdomen: Soft, non tender, and without distention. Active bowel sounds in all four quadrants.   Extremities: No cyanosis, clubbing, erythema, nor edema.   Monofilament testing with a 10 gram force: sensation intact: intact bilaterally  Visual Inspection: Feet without maceration, ulcers, " fissures.  Pedal pulses: intact bilaterally    Neurological: Alert and oriented x 3.   Psychiatric:  Behavior, mood, and affect are appropriate.      Assessment and Plan:     The following treatment plan was discussed:  Reviewed previous labs and previous primary care provider notes with patient answered all questions.  Patient is overdue for labs.  Lab orders have been placed as indicated below.  Patient is overdue for Pap and mammogram screening, orders have been placed.    1. Encounter to establish care    2. Class 1 obesity due to excess calories with serious comorbidity and body mass index (BMI) of 34.0 to 34.9 in adult  I advise to reduce sugar/carbohydrate/alcohol, eat more vegetables and lean meats such as fish/chicken/turkey. We also recommend 150 minutes of cardiovascular exercise weekly    3. Type 2 diabetes mellitus without complication, without long-term current use of insulin (HCC)  Current status unknown.  Continue current medications.  Patient also taking statin, ACE-I.   Labwork as indicated.  Diabetic foot exam and eye exams performed today in clinic.  Discussed the importance of healthy diet lifestyle modifications to help improve and control diabetes.  Discussed risks associated with micro and macro vascular damage that can occur with diabetes.  Handouts provided about diabetic foot care, recommended lotion once daily on tops and bottoms of feet not between toes.  - POCT Retinal Eye Exam  - Microalbumin Creat Ratio Urine - Clinic Collect; Future  - Comp Metabolic Panel; Future  - Hemoglobin A1c; Future  - Lipid Profile; Future  - Diabetic Monofilament LE Exam  - metFORMIN (GLUCOPHAGE) 500 MG Tab; TAKE 1 TABLET BY MOUTH ONCE DAILY  Dispense: 90 Tablet; Refill: 0    4. Hypertension, unspecified type  Chronic well-controlled condition.  Refill of lisinopril/hydrochlorothiazide was sent to pharmacy.  Discussed importance of ongoing diet lifestyle modifications including monitoring salt intake.  Lab  orders placed as indicated  - lisinopril-hydrochlorothiazide (PRINZIDE) 20-12.5 MG per tablet; Take 1 Tablet by mouth every day.  Dispense: 90 Tablet; Refill: 0    5. Encounter for screening mammogram for breast cancer  - MA-SCREENING MAMMO BILAT W/TOMOSYNTHESIS W/CAD; Future      Any change or worsening of signs or symptoms, patient encouraged to follow-up or report to urgent care or emergency room for further evaluation. Patient verbalizes understanding and agrees.    Follow-Up: Return in about 4 weeks (around 8/31/2022) for Follow up labs, DM.      PLEASE NOTE: This dictation was created using voice recognition software. I have made every reasonable attempt to correct obvious errors, but I expect that there are errors of grammar and possibly content that I did not discover before finalizing the note.

## 2022-08-03 NOTE — ASSESSMENT & PLAN NOTE
Very pleasant 41-year-old female presents today to establish care and is requesting refills of her blood pressure and diabetic medication.  I reviewed and updated her medical history, surgical history, family history, medications, allergies, social determinants of health, and problem list.    Patient is currently overdue for labs.  Lab orders have been placed as indicated below.    Reviewed care gaps with patient indicating she is due for multiple vaccines including COVID-vaccine and pneumonia vaccine.  Patient has declined all vaccines today and is aware the risks associated in doing so.    Patient is overdue on Pap and mammogram screening.  Patient is agreeable for some placed today for mammogram and will schedule Pap in clinic.

## 2022-08-04 DIAGNOSIS — E11.9 TYPE 2 DIABETES MELLITUS WITHOUT COMPLICATION, WITHOUT LONG-TERM CURRENT USE OF INSULIN (HCC): ICD-10-CM

## 2022-08-05 LAB
CREAT UR-MCNC: 112 MG/DL
MICROALBUMIN UR-MCNC: <1.2 MG/DL
MICROALBUMIN/CREAT UR: NORMAL MG/G (ref 0–30)

## 2022-08-11 ENCOUNTER — OFFICE VISIT (OUTPATIENT)
Dept: URGENT CARE | Facility: PHYSICIAN GROUP | Age: 42
End: 2022-08-11
Payer: COMMERCIAL

## 2022-08-11 VITALS
TEMPERATURE: 98.1 F | DIASTOLIC BLOOD PRESSURE: 74 MMHG | HEART RATE: 68 BPM | SYSTOLIC BLOOD PRESSURE: 108 MMHG | WEIGHT: 201 LBS | OXYGEN SATURATION: 97 % | RESPIRATION RATE: 18 BRPM | HEIGHT: 63 IN | BODY MASS INDEX: 35.61 KG/M2

## 2022-08-11 DIAGNOSIS — M54.31 SCIATICA, RIGHT SIDE: ICD-10-CM

## 2022-08-11 DIAGNOSIS — M25.551 HIP PAIN, ACUTE, RIGHT: ICD-10-CM

## 2022-08-11 PROCEDURE — 99214 OFFICE O/P EST MOD 30 MIN: CPT | Performed by: NURSE PRACTITIONER

## 2022-08-11 RX ORDER — CYCLOBENZAPRINE HCL 5 MG
5-10 TABLET ORAL 3 TIMES DAILY PRN
Qty: 30 TABLET | Refills: 0 | Status: SHIPPED | OUTPATIENT
Start: 2022-08-11 | End: 2022-08-21

## 2022-08-11 RX ORDER — KETOROLAC TROMETHAMINE 30 MG/ML
30 INJECTION, SOLUTION INTRAMUSCULAR; INTRAVENOUS ONCE
Status: COMPLETED | OUTPATIENT
Start: 2022-08-11 | End: 2022-08-11

## 2022-08-11 RX ORDER — METHYLPREDNISOLONE 4 MG/1
TABLET ORAL
Qty: 21 TABLET | Refills: 0 | Status: SHIPPED | OUTPATIENT
Start: 2022-08-11 | End: 2022-08-31

## 2022-08-11 RX ADMIN — KETOROLAC TROMETHAMINE 30 MG: 30 INJECTION, SOLUTION INTRAMUSCULAR; INTRAVENOUS at 16:12

## 2022-08-11 ASSESSMENT — PAIN SCALES - GENERAL: PAINLEVEL: 10=SEVERE PAIN

## 2022-08-11 ASSESSMENT — FIBROSIS 4 INDEX: FIB4 SCORE: 0.93

## 2022-08-12 ENCOUNTER — NON-PROVIDER VISIT (OUTPATIENT)
Dept: URGENT CARE | Facility: PHYSICIAN GROUP | Age: 42
End: 2022-08-12
Payer: COMMERCIAL

## 2022-08-12 ENCOUNTER — APPOINTMENT (OUTPATIENT)
Dept: RADIOLOGY | Facility: IMAGING CENTER | Age: 42
End: 2022-08-12
Attending: NURSE PRACTITIONER
Payer: COMMERCIAL

## 2022-08-12 DIAGNOSIS — M25.551 HIP PAIN, ACUTE, RIGHT: ICD-10-CM

## 2022-08-12 PROCEDURE — 73501 X-RAY EXAM HIP UNI 1 VIEW: CPT | Mod: TC,FY,RT | Performed by: PHYSICIAN ASSISTANT

## 2022-08-12 RX ORDER — HYDROCODONE BITARTRATE AND ACETAMINOPHEN 5; 325 MG/1; MG/1
1 TABLET ORAL EVERY 4 HOURS PRN
Qty: 15 TABLET | Refills: 0 | Status: SHIPPED | OUTPATIENT
Start: 2022-08-12 | End: 2022-08-17

## 2022-08-12 ASSESSMENT — ENCOUNTER SYMPTOMS: HIP PAIN: 1

## 2022-08-12 NOTE — PROGRESS NOTES
Subjective:     Avril Keller is a 41 y.o. female who presents for Hip Pain (X2 weeks felt something pop while walking down stairs, throbbing, stabbing pain, IBU PRN not helping )      Hip Pain    Pt presents for evaluation of a new problem. Avril is a pleasant 41 year old female who presents to  today with complaints of severe right-sided hip pain that radiates down to her anterior thigh after suffering a fall down her stairs at home 2 weeks ago.  She notes it was only a few steps that she fell down but she did fall on her right hip.  Her pain has significantly worsened in the past 2 weeks.  She notes difficulty sitting, standing, lying down.  Her pain is rated as a 10/10.  She denies any paresthesias but notes no weakness due to her pain level.  She has been using ibuprofen however, this is not effective in treating her pain.  No previous right-sided hip pain or injuries.     ROS    PMH:   Past Medical History:   Diagnosis Date    Diabetes (HCC)     Hypertension      ALLERGIES:   Allergies   Allergen Reactions    Rosuvastatin Unspecified     Flu like symptoms, Achy, Nausea, Diarrhea       SURGHX:   Past Surgical History:   Procedure Laterality Date    PRIMARY C SECTION       SOCHX:   Social History     Socioeconomic History    Marital status: Unknown   Tobacco Use    Smoking status: Former     Types: Cigarettes     Quit date: 10/3/2015     Years since quittin.8    Smokeless tobacco: Former     Quit date: 10/3/2015   Vaping Use    Vaping Use: Never used   Substance and Sexual Activity    Alcohol use: Yes     Comment: occ    Drug use: Yes     Types: Marijuana    Sexual activity: Yes     Partners: Male     Birth control/protection: None     FH:   Family History   Problem Relation Age of Onset    Liver Cancer Mother     Diabetes Father     Liver Cancer Father     Diabetes Maternal Grandmother     Heart Attack Maternal Grandmother     No Known Problems Maternal Grandfather     No Known Problems Paternal  "Grandmother     Pancreatic Cancer Paternal Grandfather          Objective:   /74 (BP Location: Right arm)   Pulse 68   Temp 36.7 °C (98.1 °F) (Temporal)   Resp 18   Ht 1.6 m (5' 3\")   Wt 91.2 kg (201 lb)   SpO2 97%   BMI 35.61 kg/m²     Physical Exam  Vitals and nursing note reviewed.   Constitutional:       General: She is not in acute distress.     Appearance: Normal appearance. She is not ill-appearing.   HENT:      Head: Normocephalic and atraumatic.      Right Ear: External ear normal.      Left Ear: External ear normal.      Nose: No congestion or rhinorrhea.      Mouth/Throat:      Mouth: Mucous membranes are moist.   Eyes:      Extraocular Movements: Extraocular movements intact.      Pupils: Pupils are equal, round, and reactive to light.   Cardiovascular:      Rate and Rhythm: Normal rate and regular rhythm.      Pulses: Normal pulses.      Heart sounds: Normal heart sounds.   Pulmonary:      Effort: Pulmonary effort is normal.      Breath sounds: Normal breath sounds.   Abdominal:      General: Abdomen is flat. Bowel sounds are normal.      Palpations: Abdomen is soft.   Musculoskeletal:      Cervical back: Normal range of motion and neck supple.      Right hip: Tenderness and bony tenderness present. No deformity, lacerations or crepitus. Decreased range of motion. Decreased strength.        Legs:       Comments: TTP.  Patient with antalgic gait.  Negative for swelling or ecchymosis.    Skin:     General: Skin is warm and dry.      Capillary Refill: Capillary refill takes less than 2 seconds.   Neurological:      General: No focal deficit present.      Mental Status: She is alert and oriented to person, place, and time. Mental status is at baseline.   Psychiatric:         Mood and Affect: Mood normal.         Behavior: Behavior normal.         Thought Content: Thought content normal.         Judgment: Judgment normal.       Assessment/Plan:   Assessment    1. Hip pain, acute, right  " DX-HIP-UNILATERAL-WITH PELVIS-1 VIEW RIGHT    ketorolac (TORADOL) injection 30 mg    cyclobenzaprine (FLEXERIL) 5 mg tablet    methylPREDNISolone (MEDROL DOSEPAK) 4 MG Tablet Therapy Pack    Referral to Pain Clinic    Referral to Orthopedics      2. Sciatica, right side            X-ray ordered outpatient notes there is no x-ray in clinic today.  We will notify her of results.  She was given Toradol 30 mg in the clinic today.  Patient educated refrain from additional NSAIDs x1 week she will start Medrol Dosepak in the morning.  Side effects of Medrol Dosepak discussed with patient.  Additionally, she was prescribed Flexeril to help her sleep at night.  Side effects discussed with patient.  She verbalizes agreement understanding of plan of care.  Continue with rest, ice, elevation.  Patient to follow-up with physiatry and orthopedics for further treatment and evaluation.    Update 8/12/2022.  Patient in clinic in [place] urgent [facility] for x-rays.  Her pain remains significant and she received no relief from Toradol injection yesterday were from Flexeril.  Antalgic gait remains present.

## 2022-08-29 ENCOUNTER — APPOINTMENT (OUTPATIENT)
Dept: URGENT CARE | Facility: PHYSICIAN GROUP | Age: 42
End: 2022-08-29
Payer: COMMERCIAL

## 2022-08-29 ENCOUNTER — HOSPITAL ENCOUNTER (OUTPATIENT)
Dept: LAB | Facility: MEDICAL CENTER | Age: 42
End: 2022-08-29
Attending: NURSE PRACTITIONER
Payer: COMMERCIAL

## 2022-08-29 DIAGNOSIS — E11.9 TYPE 2 DIABETES MELLITUS WITHOUT COMPLICATION, WITHOUT LONG-TERM CURRENT USE OF INSULIN (HCC): ICD-10-CM

## 2022-08-29 LAB
ALBUMIN SERPL BCP-MCNC: 4.1 G/DL (ref 3.2–4.9)
ALBUMIN/GLOB SERPL: 1.2 G/DL
ALP SERPL-CCNC: 119 U/L (ref 30–99)
ALT SERPL-CCNC: 19 U/L (ref 2–50)
ANION GAP SERPL CALC-SCNC: 7 MMOL/L (ref 7–16)
AST SERPL-CCNC: 20 U/L (ref 12–45)
BILIRUB SERPL-MCNC: 0.3 MG/DL (ref 0.1–1.5)
BUN SERPL-MCNC: 7 MG/DL (ref 8–22)
CALCIUM SERPL-MCNC: 9.1 MG/DL (ref 8.5–10.5)
CHLORIDE SERPL-SCNC: 101 MMOL/L (ref 96–112)
CHOLEST SERPL-MCNC: 155 MG/DL (ref 100–199)
CO2 SERPL-SCNC: 28 MMOL/L (ref 20–33)
CREAT SERPL-MCNC: 0.61 MG/DL (ref 0.5–1.4)
EST. AVERAGE GLUCOSE BLD GHB EST-MCNC: 123 MG/DL
FASTING STATUS PATIENT QL REPORTED: NORMAL
GFR SERPLBLD CREATININE-BSD FMLA CKD-EPI: 113 ML/MIN/1.73 M 2
GLOBULIN SER CALC-MCNC: 3.3 G/DL (ref 1.9–3.5)
GLUCOSE SERPL-MCNC: 103 MG/DL (ref 65–99)
HBA1C MFR BLD: 5.9 % (ref 4–5.6)
HDLC SERPL-MCNC: 38 MG/DL
LDLC SERPL CALC-MCNC: 71 MG/DL
POTASSIUM SERPL-SCNC: 4.3 MMOL/L (ref 3.6–5.5)
PROT SERPL-MCNC: 7.4 G/DL (ref 6–8.2)
RETINAL SCREEN: NEGATIVE
SODIUM SERPL-SCNC: 136 MMOL/L (ref 135–145)
TRIGL SERPL-MCNC: 232 MG/DL (ref 0–149)

## 2022-08-29 PROCEDURE — 80053 COMPREHEN METABOLIC PANEL: CPT

## 2022-08-29 PROCEDURE — 36415 COLL VENOUS BLD VENIPUNCTURE: CPT

## 2022-08-29 PROCEDURE — 83036 HEMOGLOBIN GLYCOSYLATED A1C: CPT

## 2022-08-29 PROCEDURE — 80061 LIPID PANEL: CPT

## 2022-08-31 ENCOUNTER — OFFICE VISIT (OUTPATIENT)
Dept: MEDICAL GROUP | Facility: PHYSICIAN GROUP | Age: 42
End: 2022-08-31
Payer: COMMERCIAL

## 2022-08-31 VITALS
RESPIRATION RATE: 16 BRPM | SYSTOLIC BLOOD PRESSURE: 110 MMHG | DIASTOLIC BLOOD PRESSURE: 78 MMHG | TEMPERATURE: 97.3 F | OXYGEN SATURATION: 100 % | HEART RATE: 61 BPM | HEIGHT: 63 IN | WEIGHT: 202.4 LBS | BODY MASS INDEX: 35.86 KG/M2

## 2022-08-31 DIAGNOSIS — E11.9 TYPE 2 DIABETES MELLITUS WITHOUT COMPLICATION, WITHOUT LONG-TERM CURRENT USE OF INSULIN (HCC): ICD-10-CM

## 2022-08-31 DIAGNOSIS — I10 HYPERTENSION, UNSPECIFIED TYPE: ICD-10-CM

## 2022-08-31 DIAGNOSIS — E66.01 CLASS 2 SEVERE OBESITY DUE TO EXCESS CALORIES WITH SERIOUS COMORBIDITY AND BODY MASS INDEX (BMI) OF 35.0 TO 35.9 IN ADULT (HCC): ICD-10-CM

## 2022-08-31 PROCEDURE — 99214 OFFICE O/P EST MOD 30 MIN: CPT | Performed by: NURSE PRACTITIONER

## 2022-08-31 ASSESSMENT — FIBROSIS 4 INDEX: FIB4 SCORE: 0.94

## 2022-08-31 ASSESSMENT — PATIENT HEALTH QUESTIONNAIRE - PHQ9: CLINICAL INTERPRETATION OF PHQ2 SCORE: 0

## 2022-08-31 NOTE — PROGRESS NOTES
"Chief Complaint   Patient presents with    Lab Results       Subjective:     HPI:   Avril Keller is a 41 y.o. female here to discuss the evaluation and management of:      Type 2 diabetes mellitus without complication, without long-term current use of insulin (CMS-[facility]) (HCC)  Chronic well-controlled condition.  Patient is at goal.  Patient is currently on metformin 500 mg once daily.  Patient is appropriately on lisinopril-hydrochlorothiazide.  Not currently on statin therapy, discussed statin therapy and prevention with patient at length.  Previously has been given atorvastatin however had severe side effects and is now listed as allergy.  Patient will start on over-the-counter omega-3.    Patient is up-to-date on monofilament and retinal screening.  Normal urine microalbumin.    Continues to struggle with weight loss.  Does report eating more carbs than she should.    Class 1 obesity due to excess calories with serious comorbidity and body mass index (BMI) of 34.0 to 34.9 in adult  Vitals 3/4/2021   WEIGHT 200   HEIGHT 5' 3\"   BMI 35.43 kg/m2     Vitals 8/31/2022   WEIGHT 202.4   HEIGHT 5' 3\"   BMI 35.85 kg/m2     Chronic and ongoing health concern.  Patient does identify with her obesity.  She does continue to struggle with eating a healthy low-carb diet and exercising regularly.        ROS:  Gen: no fevers/chills, no changes in weight  Pulm: no sob, no cough  CV: no chest pain, no palpitations  GI: no nausea/vomiting, no diarrhea  Neuro: no headaches, no numbness/tingling      Allergies   Allergen Reactions    Rosuvastatin Unspecified     Flu like symptoms, Achy, Nausea, Diarrhea         Current medicines (including changes today)  Current Outpatient Medications   Medication Sig Dispense Refill    metFORMIN (GLUCOPHAGE) 500 MG Tab TAKE 1 TABLET BY MOUTH ONCE DAILY 90 Tablet 0    lisinopril-hydrochlorothiazide (PRINZIDE) 20-12.5 MG per tablet Take 1 Tablet by mouth every day. 90 Tablet 0     No current " "facility-administered medications for this visit.          Objective:       /78   Pulse 61   Temp 36.3 °C (97.3 °F) (Temporal)   Resp 16   Ht 1.6 m (5' 3\")   Wt 91.8 kg (202 lb 6.4 oz)   SpO2 100%  Body mass index is 35.85 kg/m².    Physical Exam:  Constitutional: Well-developed and well-nourished female in NAD. Not diaphoretic. No distress.   Skin: warm, dry, intact  Cardiovascular: Regular rate and rhythm without murmur. Radial pulses are intact and equal bilaterally.  Pulmonary: Clear to ausculation. Normal effort. No rales, ronchi, or wheezing.  Abdomen: Soft, non tender, and without distention. Active bowel sounds in all four quadrants.   Extremities: No cyanosis, clubbing, erythema, nor edema.   Neurological: Alert and oriented x 3.   Psychiatric:  Behavior, mood, and affect are appropriate.      Assessment and Plan:     The following treatment plan was discussed:  I have reviewed labs with patient and answered all questions.    1. Class 1 obesity due to excess calories with serious comorbidity and body mass index (BMI) of 35 in adult  - Patient identified as having weight management issue.  Appropriate orders and counseling given.  I advise to reduce sugar/carbohydrate/alcohol, eat more vegetables and lean meats such as fish/chicken/turkey. We also recommend 150 minutes of cardiovascular exercise weekly      2. Type 2 diabetes mellitus without complication, without long-term current use of insulin (HCC)  Diabetes currently controlled.  Continue current medications.  No refills needed.   Labwork as indicated.    Intolerant to statins.  Patient will start omega-3 fatty acid supplements over-the-counter.  Appropriately on ACE inhibitor.  Diabetic foot exam and eye exams up to date.  Discussed the importance of healthy diet lifestyle modifications to help improve and control diabetes.  Discussed risks associated with micro and macro vascular damage that can occur with diabetes.  - HEMOGLOBIN A1C; " Future  - Lipid Profile; Future  - Comp Metabolic Panel; Future    3. Hypertension, unspecified type  Well-controlled on current regimen.  Labs as indicated.  Continue antihypertensive medications.  Discussed decreasing salt intake.  Emphasized benefits of exercise and diet. Continue to monitor.  - CBC WITH DIFFERENTIAL; Future    Any change or worsening of signs or symptoms, patient encouraged to follow-up or report to urgent care or emergency room for further evaluation. Patient verbalizes understanding and agrees.    Follow-Up: Return in about 6 months (around 2/28/2023) for Follow up labs, DM (need pap in the next 6 month).      PLEASE NOTE: This dictation was created using voice recognition software. I have made every reasonable attempt to correct obvious errors, but I expect that there are errors of grammar and possibly content that I did not discover before finalizing the note.

## 2022-08-31 NOTE — ASSESSMENT & PLAN NOTE
Chronic well-controlled condition.  Patient is at goal.  Patient is currently on metformin 500 mg once daily.  Patient is appropriately on lisinopril-hydrochlorothiazide.  Not currently on statin therapy, discussed statin therapy and prevention with patient at length.  Previously has been given atorvastatin however had severe side effects and is now listed as allergy.  Patient will start on over-the-counter omega-3.    Patient is up-to-date on monofilament and retinal screening.  Normal urine microalbumin.    Continues to struggle with weight loss.  Does report eating more carbs than she should.

## 2022-08-31 NOTE — ASSESSMENT & PLAN NOTE
"Vitals 3/4/2021   WEIGHT 200   HEIGHT 5' 3\"   BMI 35.43 kg/m2     Vitals 8/31/2022   WEIGHT 202.4   HEIGHT 5' 3\"   BMI 35.85 kg/m2     Chronic and ongoing health concern.  Patient does identify with her obesity.  She does continue to struggle with eating a healthy low-carb diet and exercising regularly.  "

## 2022-09-07 ENCOUNTER — APPOINTMENT (OUTPATIENT)
Dept: RADIOLOGY | Facility: MEDICAL CENTER | Age: 42
End: 2022-09-07
Attending: NURSE PRACTITIONER
Payer: COMMERCIAL

## 2022-10-21 ENCOUNTER — APPOINTMENT (OUTPATIENT)
Dept: RADIOLOGY | Facility: MEDICAL CENTER | Age: 42
End: 2022-10-21
Attending: NURSE PRACTITIONER
Payer: COMMERCIAL

## 2022-11-22 DIAGNOSIS — I10 HYPERTENSION, UNSPECIFIED TYPE: ICD-10-CM

## 2022-11-22 RX ORDER — LISINOPRIL AND HYDROCHLOROTHIAZIDE 20; 12.5 MG/1; MG/1
TABLET ORAL
Qty: 100 TABLET | Refills: 0 | Status: SHIPPED | OUTPATIENT
Start: 2022-11-22 | End: 2023-02-15

## 2022-11-22 NOTE — TELEPHONE ENCOUNTER
Received request via: Pharmacy    Was the patient seen in the last year in this department? Yes    Does the patient have an active prescription (recently filled or refills available) for medication(s) requested? No    Does the patient have care home Plus and need 100 day supply (blood pressure, diabetes and cholesterol meds only)? Patient does not have SCP      Last office Visit:08/31/2022  Last Labs:08/29/2022

## 2023-01-12 ENCOUNTER — OFFICE VISIT (OUTPATIENT)
Dept: MEDICAL GROUP | Facility: PHYSICIAN GROUP | Age: 43
End: 2023-01-12
Payer: COMMERCIAL

## 2023-01-12 ENCOUNTER — HOSPITAL ENCOUNTER (OUTPATIENT)
Facility: MEDICAL CENTER | Age: 43
End: 2023-01-12
Attending: NURSE PRACTITIONER
Payer: COMMERCIAL

## 2023-01-12 VITALS
OXYGEN SATURATION: 97 % | RESPIRATION RATE: 18 BRPM | TEMPERATURE: 97.8 F | DIASTOLIC BLOOD PRESSURE: 82 MMHG | SYSTOLIC BLOOD PRESSURE: 138 MMHG | BODY MASS INDEX: 35.83 KG/M2 | WEIGHT: 202.2 LBS | HEIGHT: 63 IN | HEART RATE: 83 BPM

## 2023-01-12 DIAGNOSIS — Z30.09 CONSULTATION FOR FEMALE STERILIZATION: ICD-10-CM

## 2023-01-12 DIAGNOSIS — Z01.419 WELL WOMAN EXAM: ICD-10-CM

## 2023-01-12 DIAGNOSIS — Z12.4 SCREENING FOR CERVICAL CANCER: ICD-10-CM

## 2023-01-12 PROCEDURE — 99396 PREV VISIT EST AGE 40-64: CPT | Performed by: NURSE PRACTITIONER

## 2023-01-12 PROCEDURE — 88175 CYTOPATH C/V AUTO FLUID REDO: CPT

## 2023-01-12 PROCEDURE — 87624 HPV HI-RISK TYP POOLED RSLT: CPT

## 2023-01-12 ASSESSMENT — FIBROSIS 4 INDEX: FIB4 SCORE: 0.97

## 2023-01-12 ASSESSMENT — PATIENT HEALTH QUESTIONNAIRE - PHQ9: CLINICAL INTERPRETATION OF PHQ2 SCORE: 0

## 2023-01-12 NOTE — PROGRESS NOTES
SUBJECTIVE: 42 y.o. female for annual routine gynecologic exam  Chief Complaint   Patient presents with    Gynecologic Exam     Pt is here for PAP        OB History    Para Term  AB Living   2 2 0 0 0 2   SAB IAB Ectopic Molar Multiple Live Births   0 0 0 0 0 0        Social History     Substance and Sexual Activity   Sexual Activity Yes    Partners: Male    Birth control/protection: None     Sexual history: currently sexually active, single partner, heterosexual, previous pregnancy     H/O Abnormal Pap no    She  reports that she quit smoking about 7 years ago. Her smoking use included cigarettes. She quit smokeless tobacco use about 7 years ago.        Allergies: Rosuvastatin     ROS:    Last Pap: 18 years ago  Reports none menopause symptoms of hot flashes, night sweats, sleep disruption, mood changes.Denies vaginal dryness.   Menses every 4 month with 7 days heavy bleeding   Patient does report history of PCOS.  Cramping is severe.   She does take OTC analgesics for cramping- dose not help much.    No significant bloating/fluid retention, pelvic pain. Positive dyspareunia.   No vaginal discharge   No breast tenderness, mass, nipple discharge, changes in size or contour, or abnormal cyclic discomfort.  No urinary tract symptoms, no incontinence, no polydipsia, polyuria,  No abdominal pain, change in bowel habits, black or bloody stools.    No unusual headaches, no visual changes, menstrual migraines   No prolonged cough. No dyspnea or chest pain on exertion.  No depression, labile mood, anxiety, insomnia.Positive for decreased libido  No temperature intolerance.  No new/concerning skin lesions, concerns.     Exercise: sporadic irregular exercise    Current medicines (including changes today)  Current Outpatient Medications   Medication Sig Dispense Refill    lisinopril-hydrochlorothiazide (PRINZIDE) 20-12.5 MG per tablet Take 1 tablet by mouth once daily 100 Tablet 0    metFORMIN (GLUCOPHAGE) 500 MG  "Tab Take 1 tablet by mouth once daily 90 Tablet 3     No current facility-administered medications for this visit.     She  has a past medical history of Diabetes (HCC) and Hypertension (2014).    She has no past medical history of Anemia, Anxiety, Arrhythmia, Arthritis, Asthma, Blood transfusion without reported diagnosis, Breast cancer (HCC), Cancer (HCC), CHF (congestive heart failure) (HCC), Clotting disorder (HCC), COPD (chronic obstructive pulmonary disease) (HCC), Depression, Diabetic neuropathy (HCC), GERD (gastroesophageal reflux disease), Heart attack (HCC), Heart murmur, HIV (human immunodeficiency virus infection) (HCC), Hyperlipidemia, Kidney disease, Migraine, Seizure (HCC), Stroke (HCC), Substance abuse (HCC), or Thyroid disease.  She  has a past surgical history that includes primary c section.     OBJECTIVE:   /82   Pulse 83   Temp 36.6 °C (97.8 °F) (Temporal)   Resp 18   Ht 1.6 m (5' 3\")   Wt 91.7 kg (202 lb 3.2 oz)   SpO2 97%   BMI 35.82 kg/m²   Body mass index is 35.82 kg/m².    CHEST:  Clear, good air entry, no wheezes or rales.   HEART:  Regular rate and rhythm.  S1 and S2 normal.  No edema   ABDOMEN:  Soft without tenderness, guarding, mass or organomegaly.    EXTREMITIES:  Extremities, reflexes and peripheral pulses are normal.   SKIN: color normal, vascularity normal, no edema, temperature normal   No rashes or suspicious skin lesions noted.     Breast Exam:  Performed with instruction during examination. No axillary lymphadenopathy, no skin changes, no dominant masses. No nipple retraction  Pelvic Exam -  Normal external genitalia with no lesions. Vaginal Mucosa:  normal vaginal mucosa . Cervix with no visible lesions. No cervical motion tenderness. Uterus is normal sized with no masses. No adnexal tenderness or enlargement appreciated. Thin Prep Pap is obtained, vaginal swab is not obtained and specimen(s) sent to lab  Rectal: no mass    <ASSESSMENT and PLAN>    1. Well woman " exam    2. Screening for cervical cancer  - Thinprep Pap with HPV; Future    3. Consultation for female sterilization  - Referral to OB/Gyn Surgery       Discussed  breast self exam, mammography screening, feminine hygiene, family planning choices, osteoporosis, adequate intake of calcium and vitamin D, diet and exercise, colorectal cancer screening    Follow-up in 1 years for next Gyn for evaluation of female sterilization exam and 3-5 years for next Pap.   Next office visit for recheck of chronic medical conditions is due in 6 months

## 2023-01-13 DIAGNOSIS — Z12.4 SCREENING FOR CERVICAL CANCER: ICD-10-CM

## 2023-01-13 LAB
CYTOLOGY REG CYTOL: NORMAL
HPV HR 12 DNA CVX QL NAA+PROBE: NEGATIVE
HPV16 DNA SPEC QL NAA+PROBE: NEGATIVE
HPV18 DNA SPEC QL NAA+PROBE: NEGATIVE
SPECIMEN SOURCE: NORMAL

## 2023-02-14 DIAGNOSIS — I10 HYPERTENSION, UNSPECIFIED TYPE: ICD-10-CM

## 2023-02-15 RX ORDER — LISINOPRIL AND HYDROCHLOROTHIAZIDE 20; 12.5 MG/1; MG/1
TABLET ORAL
Qty: 100 TABLET | Refills: 0 | Status: SHIPPED | OUTPATIENT
Start: 2023-02-15 | End: 2023-06-05 | Stop reason: SDUPTHER

## 2023-02-15 NOTE — TELEPHONE ENCOUNTER
Received request via: Pharmacy    Was the patient seen in the last year in this department? Yes    Does the patient have an active prescription (recently filled or refills available) for medication(s) requested? No    Does the patient have detention Plus and need 100 day supply (blood pressure, diabetes and cholesterol meds only)? Patient does not have SCP      Last Office Visit:01/12/2023  Last Labs:08/29/2022

## 2023-12-22 ENCOUNTER — HOSPITAL ENCOUNTER (OUTPATIENT)
Dept: LAB | Facility: MEDICAL CENTER | Age: 43
End: 2023-12-22
Attending: PATHOLOGY

## 2023-12-22 ENCOUNTER — APPOINTMENT (OUTPATIENT)
Dept: URGENT CARE | Facility: PHYSICIAN GROUP | Age: 43
End: 2023-12-22
Payer: COMMERCIAL

## 2023-12-22 LAB — HCG SERPL QL: POSITIVE

## 2023-12-22 PROCEDURE — 84703 CHORIONIC GONADOTROPIN ASSAY: CPT

## 2023-12-22 PROCEDURE — 36415 COLL VENOUS BLD VENIPUNCTURE: CPT

## 2023-12-28 SDOH — HEALTH STABILITY: PHYSICAL HEALTH: ON AVERAGE, HOW MANY DAYS PER WEEK DO YOU ENGAGE IN MODERATE TO STRENUOUS EXERCISE (LIKE A BRISK WALK)?: 2 DAYS

## 2023-12-28 SDOH — ECONOMIC STABILITY: FOOD INSECURITY: WITHIN THE PAST 12 MONTHS, THE FOOD YOU BOUGHT JUST DIDN'T LAST AND YOU DIDN'T HAVE MONEY TO GET MORE.: NEVER TRUE

## 2023-12-28 SDOH — ECONOMIC STABILITY: HOUSING INSECURITY
IN THE LAST 12 MONTHS, WAS THERE A TIME WHEN YOU DID NOT HAVE A STEADY PLACE TO SLEEP OR SLEPT IN A SHELTER (INCLUDING NOW)?: NO

## 2023-12-28 SDOH — ECONOMIC STABILITY: INCOME INSECURITY: HOW HARD IS IT FOR YOU TO PAY FOR THE VERY BASICS LIKE FOOD, HOUSING, MEDICAL CARE, AND HEATING?: NOT HARD AT ALL

## 2023-12-28 SDOH — ECONOMIC STABILITY: FOOD INSECURITY: WITHIN THE PAST 12 MONTHS, YOU WORRIED THAT YOUR FOOD WOULD RUN OUT BEFORE YOU GOT MONEY TO BUY MORE.: NEVER TRUE

## 2023-12-28 SDOH — ECONOMIC STABILITY: TRANSPORTATION INSECURITY
IN THE PAST 12 MONTHS, HAS LACK OF RELIABLE TRANSPORTATION KEPT YOU FROM MEDICAL APPOINTMENTS, MEETINGS, WORK OR FROM GETTING THINGS NEEDED FOR DAILY LIVING?: NO

## 2023-12-28 SDOH — ECONOMIC STABILITY: INCOME INSECURITY: IN THE LAST 12 MONTHS, WAS THERE A TIME WHEN YOU WERE NOT ABLE TO PAY THE MORTGAGE OR RENT ON TIME?: NO

## 2023-12-28 SDOH — ECONOMIC STABILITY: HOUSING INSECURITY: IN THE LAST 12 MONTHS, HOW MANY PLACES HAVE YOU LIVED?: 1

## 2023-12-28 SDOH — HEALTH STABILITY: MENTAL HEALTH
STRESS IS WHEN SOMEONE FEELS TENSE, NERVOUS, ANXIOUS, OR CAN'T SLEEP AT NIGHT BECAUSE THEIR MIND IS TROUBLED. HOW STRESSED ARE YOU?: ONLY A LITTLE

## 2023-12-28 SDOH — HEALTH STABILITY: PHYSICAL HEALTH: ON AVERAGE, HOW MANY MINUTES DO YOU ENGAGE IN EXERCISE AT THIS LEVEL?: 20 MIN

## 2023-12-28 SDOH — ECONOMIC STABILITY: TRANSPORTATION INSECURITY
IN THE PAST 12 MONTHS, HAS THE LACK OF TRANSPORTATION KEPT YOU FROM MEDICAL APPOINTMENTS OR FROM GETTING MEDICATIONS?: NO

## 2023-12-28 SDOH — ECONOMIC STABILITY: TRANSPORTATION INSECURITY
IN THE PAST 12 MONTHS, HAS LACK OF TRANSPORTATION KEPT YOU FROM MEETINGS, WORK, OR FROM GETTING THINGS NEEDED FOR DAILY LIVING?: NO

## 2023-12-28 ASSESSMENT — LIFESTYLE VARIABLES
HOW OFTEN DO YOU HAVE SIX OR MORE DRINKS ON ONE OCCASION: LESS THAN MONTHLY
HOW OFTEN DO YOU HAVE A DRINK CONTAINING ALCOHOL: MONTHLY OR LESS
SKIP TO QUESTIONS 9-10: 0
AUDIT-C TOTAL SCORE: 4
HOW MANY STANDARD DRINKS CONTAINING ALCOHOL DO YOU HAVE ON A TYPICAL DAY: 5 OR 6

## 2023-12-28 ASSESSMENT — SOCIAL DETERMINANTS OF HEALTH (SDOH)
HOW OFTEN DO YOU ATTENT MEETINGS OF THE CLUB OR ORGANIZATION YOU BELONG TO?: NEVER
HOW MANY DRINKS CONTAINING ALCOHOL DO YOU HAVE ON A TYPICAL DAY WHEN YOU ARE DRINKING: 5 OR 6
HOW OFTEN DO YOU ATTEND CHURCH OR RELIGIOUS SERVICES?: NEVER
HOW OFTEN DO YOU ATTENT MEETINGS OF THE CLUB OR ORGANIZATION YOU BELONG TO?: NEVER
WITHIN THE PAST 12 MONTHS, YOU WORRIED THAT YOUR FOOD WOULD RUN OUT BEFORE YOU GOT THE MONEY TO BUY MORE: NEVER TRUE
IN A TYPICAL WEEK, HOW MANY TIMES DO YOU TALK ON THE PHONE WITH FAMILY, FRIENDS, OR NEIGHBORS?: MORE THAN THREE TIMES A WEEK
DO YOU BELONG TO ANY CLUBS OR ORGANIZATIONS SUCH AS CHURCH GROUPS UNIONS, FRATERNAL OR ATHLETIC GROUPS, OR SCHOOL GROUPS?: NO
HOW OFTEN DO YOU ATTEND CHURCH OR RELIGIOUS SERVICES?: NEVER
HOW OFTEN DO YOU HAVE A DRINK CONTAINING ALCOHOL: MONTHLY OR LESS
DO YOU BELONG TO ANY CLUBS OR ORGANIZATIONS SUCH AS CHURCH GROUPS UNIONS, FRATERNAL OR ATHLETIC GROUPS, OR SCHOOL GROUPS?: NO
HOW OFTEN DO YOU GET TOGETHER WITH FRIENDS OR RELATIVES?: TWICE A WEEK
HOW OFTEN DO YOU GET TOGETHER WITH FRIENDS OR RELATIVES?: TWICE A WEEK
HOW HARD IS IT FOR YOU TO PAY FOR THE VERY BASICS LIKE FOOD, HOUSING, MEDICAL CARE, AND HEATING?: NOT HARD AT ALL
IN A TYPICAL WEEK, HOW MANY TIMES DO YOU TALK ON THE PHONE WITH FAMILY, FRIENDS, OR NEIGHBORS?: MORE THAN THREE TIMES A WEEK
HOW OFTEN DO YOU HAVE SIX OR MORE DRINKS ON ONE OCCASION: LESS THAN MONTHLY

## 2023-12-29 ENCOUNTER — HOSPITAL ENCOUNTER (OUTPATIENT)
Facility: MEDICAL CENTER | Age: 43
End: 2023-12-29
Attending: FAMILY MEDICINE
Payer: COMMERCIAL

## 2023-12-29 ENCOUNTER — OFFICE VISIT (OUTPATIENT)
Dept: MEDICAL GROUP | Facility: PHYSICIAN GROUP | Age: 43
End: 2023-12-29
Payer: COMMERCIAL

## 2023-12-29 VITALS
WEIGHT: 204 LBS | BODY MASS INDEX: 36.14 KG/M2 | OXYGEN SATURATION: 96 % | SYSTOLIC BLOOD PRESSURE: 132 MMHG | HEIGHT: 63 IN | RESPIRATION RATE: 14 BRPM | DIASTOLIC BLOOD PRESSURE: 78 MMHG | TEMPERATURE: 97.8 F | HEART RATE: 96 BPM

## 2023-12-29 DIAGNOSIS — I10 HYPERTENSION, UNSPECIFIED TYPE: ICD-10-CM

## 2023-12-29 DIAGNOSIS — Z34.90 PREGNANCY, UNSPECIFIED GESTATIONAL AGE: ICD-10-CM

## 2023-12-29 DIAGNOSIS — E11.9 TYPE 2 DIABETES MELLITUS WITHOUT COMPLICATION, WITHOUT LONG-TERM CURRENT USE OF INSULIN (HCC): ICD-10-CM

## 2023-12-29 DIAGNOSIS — Z12.12 SCREENING FOR COLORECTAL CANCER: ICD-10-CM

## 2023-12-29 DIAGNOSIS — Z11.59 NEED FOR HEPATITIS C SCREENING TEST: ICD-10-CM

## 2023-12-29 DIAGNOSIS — Z12.11 SCREENING FOR COLON CANCER: ICD-10-CM

## 2023-12-29 DIAGNOSIS — Z12.11 SCREENING FOR COLORECTAL CANCER: ICD-10-CM

## 2023-12-29 PROBLEM — N92.1 MENORRHAGIA WITH IRREGULAR CYCLE: Status: RESOLVED | Noted: 2018-02-12 | Resolved: 2023-12-29

## 2023-12-29 LAB
CREAT UR-MCNC: 114.75 MG/DL
HBA1C MFR BLD: 7.3 % (ref ?–5.8)
MICROALBUMIN UR-MCNC: 1.2 MG/DL
MICROALBUMIN/CREAT UR: 10 MG/G (ref 0–30)
POCT INT CON NEG: NEGATIVE
POCT INT CON POS: POSITIVE

## 2023-12-29 PROCEDURE — 3078F DIAST BP <80 MM HG: CPT | Performed by: FAMILY MEDICINE

## 2023-12-29 PROCEDURE — 3075F SYST BP GE 130 - 139MM HG: CPT | Performed by: FAMILY MEDICINE

## 2023-12-29 PROCEDURE — 82043 UR ALBUMIN QUANTITATIVE: CPT

## 2023-12-29 PROCEDURE — 83036 HEMOGLOBIN GLYCOSYLATED A1C: CPT | Performed by: FAMILY MEDICINE

## 2023-12-29 PROCEDURE — 99214 OFFICE O/P EST MOD 30 MIN: CPT | Performed by: FAMILY MEDICINE

## 2023-12-29 PROCEDURE — 82570 ASSAY OF URINE CREATININE: CPT

## 2023-12-29 PROCEDURE — 92250 FUNDUS PHOTOGRAPHY W/I&R: CPT | Mod: TC | Performed by: FAMILY MEDICINE

## 2023-12-29 NOTE — ASSESSMENT & PLAN NOTE
Patient is excited just found out she is pregnant  Has appt with ob, her kids are 24 and 19  She is taking her prenatal vitamins.   Declines influenza vaccination today I encouraged her to get this done at the pharmacy.

## 2023-12-29 NOTE — ASSESSMENT & PLAN NOTE
Controlled with weight loss and diet and exercise. No longer on lisinopril.   Continue to monitor.

## 2023-12-29 NOTE — ASSESSMENT & PLAN NOTE
Controlled with diet.   A1c:   Lab Results   Component Value Date/Time    HBA1C 5.9 (H) 08/29/2022 0903    AVGLUC 123 08/29/2022 0903     Lipids:   Lab Results   Component Value Date/Time    CHOLSTRLTOT 155 08/29/2022 09:03 AM    TRIGLYCERIDE 232 (H) 08/29/2022 09:03 AM    HDL 38 (A) 08/29/2022 09:03 AM    LDL 71 08/29/2022 09:03 AM   ]  BMP:   Lab Results   Component Value Date/Time    SODIUM 136 08/29/2022 0903    POTASSIUM 4.3 08/29/2022 0903    CHLORIDE 101 08/29/2022 0903    CO2 28 08/29/2022 0903    GLUCOSE 103 (H) 08/29/2022 0903    BUN 7 (L) 08/29/2022 0903    CREATININE 0.61 08/29/2022 0903    CALCIUM 9.1 08/29/2022 0903    ANION 7.0 08/29/2022 0903     GFR:   Lab Results   Component Value Date/Time    IFAFRICA >60 03/02/2021 0908    IFNOTAFR >60 03/02/2021 0908     Last eye exam: up to date  Foot exam: no foot ulcers.   Medications: was on metformin, now controlled with diet.

## 2023-12-29 NOTE — PROGRESS NOTES
Subjective:   Avril Keller is a 43 y.o. female here today for evaluation and management of:     Type 2 diabetes mellitus without complication, without long-term current use of insulin (CMS-[facility]) ([facility])  Controlled with diet.   A1c:   Lab Results   Component Value Date/Time    HBA1C 5.9 (H) 08/29/2022 0903    AVGLUC 123 08/29/2022 0903     Lipids:   Lab Results   Component Value Date/Time    CHOLSTRLTOT 155 08/29/2022 09:03 AM    TRIGLYCERIDE 232 (H) 08/29/2022 09:03 AM    HDL 38 (A) 08/29/2022 09:03 AM    LDL 71 08/29/2022 09:03 AM   ]  BMP:   Lab Results   Component Value Date/Time    SODIUM 136 08/29/2022 0903    POTASSIUM 4.3 08/29/2022 0903    CHLORIDE 101 08/29/2022 0903    CO2 28 08/29/2022 0903    GLUCOSE 103 (H) 08/29/2022 0903    BUN 7 (L) 08/29/2022 0903    CREATININE 0.61 08/29/2022 0903    CALCIUM 9.1 08/29/2022 0903    ANION 7.0 08/29/2022 0903     GFR:   Lab Results   Component Value Date/Time    IFAFRICA >60 03/02/2021 0908    IFNOTAFR >60 03/02/2021 0908     Last eye exam: up to date  Foot exam: no foot ulcers.   Medications: was on metformin, now controlled with diet.       Pregnancy  Patient is excited just found out she is pregnant  Has appt with ob, her kids are 24 and 19  She is taking her prenatal vitamins.   Declines influenza vaccination today I encouraged her to get this done at the pharmacy.     Hypertension  Controlled with weight loss and diet and exercise. No longer on lisinopril.   Continue to monitor.              Current medicines (including changes today)  No current outpatient medications on file.     No current facility-administered medications for this visit.     She  has a past medical history of Diabetes ([facility]) and Hypertension (2014).    She has no past medical history of Anemia, Anxiety, Arrhythmia, Arthritis, Asthma, Blood transfusion without reported diagnosis, Breast cancer ([facility]), Cancer (HCC), CHF (congestive heart failure) ([facility]), Clotting disorder ([facility]), COPD (chronic  "obstructive pulmonary disease) (HCC), Depression, Diabetic neuropathy (HCC), GERD (gastroesophageal reflux disease), Heart attack (HCC), Heart murmur, HIV (human immunodeficiency virus infection) (HCC), Hyperlipidemia, Kidney disease, Migraine, Seizure (HCC), Stroke (HCC), Substance abuse (HCC), or Thyroid disease.    ROS  No chest pain, no shortness of breath, no abdominal pain       Objective:     /78 (BP Location: Left arm, Patient Position: Sitting, BP Cuff Size: Adult long)   Pulse 96   Temp 36.6 °C (97.8 °F) (Temporal)   Resp 14   Ht 1.6 m (5' 3\")   Wt 92.5 kg (204 lb)   SpO2 96%  Body mass index is 36.14 kg/m².   Physical Exam:  Constitutional: Alert, no distress.  Skin: Warm, dry, good turgor, no rashes in visible areas.  Eye: Equal, round and reactive, conjunctiva clear, lids normal.  ENMT: Lips without lesions, good dentition, oropharynx clear.  Neck: Trachea midline, no masses, no thyromegaly. No cervical or supraclavicular lymphadenopathy  Respiratory: Unlabored respiratory effort, lungs clear to auscultation, no wheezes, no ronchi.  Cardiovascular: Normal S1, S2, no murmur, no edema.  Abdomen: Soft, non-tender, no masses, no hepatosplenomegaly.  Psych: Alert and oriented x3, normal affect and mood.        Assessment and Plan:   The following treatment plan was discussed    1. Type 2 diabetes mellitus without complication, without long-term current use of insulin ([facility])  - POCT A1C  - POCT Retinal Eye Exam  - Microalbumin Creat Ratio Urine (Clinic Collect); Future  - Diabetic Monofilament LE Exam  - Lipid Profile; Future  - Comp Metabolic Panel; Future  - Comp Metabolic Panel; Future  - HEMOGLOBIN A1C; Future    2. Need for hepatitis C screening test  - HEP C VIRUS ANTIBODY; Future    3. Screening for colorectal cancer  - Referral to GI for Colonoscopy    4. Screening for colon cancer  - MA-SCREENING MAMMO BILAT W/TOMOSYNTHESIS W/CAD; Future    5. Pregnancy, unspecified gestational age    6. " Hypertension, unspecified type      Followup: Return for Lab Review April .

## 2024-01-02 LAB — RETINAL SCREEN: NEGATIVE

## 2024-01-30 NOTE — RESULT ENCOUNTER NOTE
Randall Keller  Your urine microalbumin (protein) level is normal! Next is due in 1 year.   Dr. Clements

## 2024-02-01 ENCOUNTER — APPOINTMENT (OUTPATIENT)
Dept: MEDICAL GROUP | Facility: PHYSICIAN GROUP | Age: 44
End: 2024-02-01
Payer: COMMERCIAL

## 2024-04-10 ENCOUNTER — APPOINTMENT (OUTPATIENT)
Dept: MEDICAL GROUP | Facility: PHYSICIAN GROUP | Age: 44
End: 2024-04-10
Payer: COMMERCIAL

## 2024-04-10 VITALS
TEMPERATURE: 97.4 F | HEIGHT: 63 IN | OXYGEN SATURATION: 97 % | BODY MASS INDEX: 35.97 KG/M2 | DIASTOLIC BLOOD PRESSURE: 76 MMHG | SYSTOLIC BLOOD PRESSURE: 122 MMHG | WEIGHT: 203 LBS | HEART RATE: 77 BPM | RESPIRATION RATE: 16 BRPM

## 2024-04-10 DIAGNOSIS — E66.09 CLASS 1 OBESITY DUE TO EXCESS CALORIES WITH SERIOUS COMORBIDITY AND BODY MASS INDEX (BMI) OF 34.0 TO 34.9 IN ADULT: ICD-10-CM

## 2024-04-10 DIAGNOSIS — Z87.59 HISTORY OF MISCARRIAGE: ICD-10-CM

## 2024-04-10 DIAGNOSIS — E11.9 TYPE 2 DIABETES MELLITUS WITHOUT COMPLICATION, WITHOUT LONG-TERM CURRENT USE OF INSULIN (HCC): ICD-10-CM

## 2024-04-10 DIAGNOSIS — Z11.4 ENCOUNTER FOR SCREENING FOR HIV: ICD-10-CM

## 2024-04-10 PROBLEM — Z34.90 PREGNANCY: Status: RESOLVED | Noted: 2023-12-29 | Resolved: 2024-04-10

## 2024-04-10 LAB
HBA1C MFR BLD: 8.9 % (ref ?–5.8)
POCT INT CON NEG: NEGATIVE
POCT INT CON POS: POSITIVE

## 2024-04-10 PROCEDURE — 3074F SYST BP LT 130 MM HG: CPT | Performed by: FAMILY MEDICINE

## 2024-04-10 PROCEDURE — 99214 OFFICE O/P EST MOD 30 MIN: CPT | Performed by: FAMILY MEDICINE

## 2024-04-10 PROCEDURE — 3078F DIAST BP <80 MM HG: CPT | Performed by: FAMILY MEDICINE

## 2024-04-10 PROCEDURE — 83036 HEMOGLOBIN GLYCOSYLATED A1C: CPT | Performed by: FAMILY MEDICINE

## 2024-04-10 RX ORDER — SEMAGLUTIDE 0.68 MG/ML
0.25 INJECTION, SOLUTION SUBCUTANEOUS
Qty: 3 ML | Refills: 11 | Status: SHIPPED | OUTPATIENT
Start: 2024-04-10

## 2024-04-10 ASSESSMENT — PATIENT HEALTH QUESTIONNAIRE - PHQ9: CLINICAL INTERPRETATION OF PHQ2 SCORE: 0

## 2024-04-10 NOTE — ASSESSMENT & PLAN NOTE
Pt is 43, has children 24 and 19 yrs old  Dec 2023, pt had been excited albeit surprised that she was pregnant. She unfortunately had a miscarriage Jan 2024.   She is processing through the grief and also some feelings of jealousy as her daughter also is pregnant at the same time.   Reassured her of these normal feelings. Encouraged her to talk about her baby and her experience.   Ref to counseling offered. Declines for now.   She feels well, normal periods, no birth control desired.

## 2024-04-10 NOTE — ASSESSMENT & PLAN NOTE
Pt has morbid obesity with uncontrolled DM2  BMI 35.96  A1c 8.9  Will benefit from diet, exercise, medication like ozempic for diabetes and weight loss to prevent complications from these conditions.   Rx for ozempic provided.

## 2024-04-10 NOTE — PROGRESS NOTES
Subjective:   Avril Keller is a 43 y.o. female here today for evaluation and management of:     Type 2 diabetes mellitus without complication, without long-term current use of insulin (CMS-[facility]) ([facility])  Presents for follow up of diabetes mellitus.   A1c today 8.9  She indicates that she is feeling well and denies any symptoms of chest pain, polyuria, polydipsia, urinary complaints, abdominal complaints, numbness of extremities.     She was on metformin in the past. Restart metformin 500 mg bid with meals to reduce side effect of stomach upset which she had in the past with metformin.     Start ozemic 0.25 mg, advised on side effects.   Continue healthy diet changes: reduce refined starches and avoid sugars and sweet drinks.   Exercise regularly for 30 min most days of the week.       She had severe side effects of muscle cramping, flu like symptoms, diarrhea with rosuvastatin, check fasting lipids.     Last eye exam within the last year.  Denies visual blurring, double vision, eye pain and floaters  Last foot exam: Normal sensation to monofilament bilaterally, no callus, ulcers or deformities. Denies foot pain, numbness, calluses, ulcers    A1c: 8.9 in clinic today  Last labs:   Lab Results   Component Value Date/Time    HBA1C 7.3 (A) 12/29/2023 1325    AVGLUC 123 08/29/2022 0903   Increased from 5.9  Lipids:   Lab Results   Component Value Date/Time    CHOLSTRLTOT 155 08/29/2022 09:03 AM    TRIGLYCERIDE 232 (H) 08/29/2022 09:03 AM    HDL 38 (A) 08/29/2022 09:03 AM    LDL 71 08/29/2022 09:03 AM   ]  BMP:   Lab Results   Component Value Date/Time    SODIUM 136 08/29/2022 0903    POTASSIUM 4.3 08/29/2022 0903    CHLORIDE 101 08/29/2022 0903    CO2 28 08/29/2022 0903    GLUCOSE 103 (H) 08/29/2022 0903    BUN 7 (L) 08/29/2022 0903    CREATININE 0.61 08/29/2022 0903    CALCIUM 9.1 08/29/2022 0903    ANION 7.0 08/29/2022 0903     GFR:   Lab Results   Component Value Date/Time    GFRCKD 113 08/29/2022 0903          Medications: start metformin, ozempic      History of miscarriage  Pt is 43, has children 24 and 19 yrs old  Dec 2023, pt had been excited albeit surprised that she was pregnant. She unfortunately had a miscarriage Jan 2024.   She is processing through the grief and also some feelings of jealousy as her daughter also is pregnant at the same time.   Reassured her of these normal feelings. Encouraged her to talk about her baby and her experience.   Ref to counseling offered. Declines for now.   She feels well, normal periods, no birth control desired.     Class 1 obesity due to excess calories with serious comorbidity and body mass index (BMI) of 34.0 to 34.9 in adult  Pt has morbid obesity with uncontrolled DM2  BMI 35.96  A1c 8.9  Will benefit from diet, exercise, medication like ozempic for diabetes and weight loss to prevent complications from these conditions.   Rx for ozempic provided.          Current medicines (including changes today)  Current Outpatient Medications   Medication Sig Dispense Refill    metFORMIN (GLUCOPHAGE) 500 MG Tab Take 1 Tablet by mouth 2 times a day with meals. 180 Tablet 3    Semaglutide,0.25 or 0.5MG/DOS, (OZEMPIC, 0.25 OR 0.5 MG/DOSE,) 2 MG/3ML Solution Pen-injector Inject 0.25 mg under the skin every 7 days. 3 mL 11     No current facility-administered medications for this visit.     She  has a past medical history of Diabetes (HCC) and Hypertension (2014).    She has no past medical history of Anemia, Anxiety, Arrhythmia, Arthritis, Asthma, Breast cancer ([facility]), Cancer (HCC), CHF (congestive heart failure) ([facility]), Clotting disorder ([facility]), COPD (chronic obstructive pulmonary disease) ([facility]), Depression, Diabetic neuropathy ([facility]), GERD (gastroesophageal reflux disease), Heart attack ([facility]), Heart murmur, HIV (human immunodeficiency virus infection) ([facility]), Hyperlipidemia, Kidney disease, Migraine, Seizure (HCC), Stroke (HCC), Substance abuse ([facility]), or Thyroid disease.    ROS  No  "chest pain, no shortness of breath, no abdominal pain       Objective:     /76   Pulse 77   Temp 36.3 °C (97.4 °F) (Temporal)   Resp 16   Ht 1.6 m (5' 3\")   Wt 92.1 kg (203 lb)   SpO2 97%  Body mass index is 35.96 kg/m².   Physical Exam:  Constitutional: Alert, no distress.  Skin: Warm, dry, good turgor, no rashes in visible areas.  Eye: Equal, round and reactive, conjunctiva clear, lids normal.  ENMT: Lips without lesions, good dentition, oropharynx clear.  Neck: Trachea midline, no masses, no thyromegaly. No cervical or supraclavicular lymphadenopathy  Respiratory: Unlabored respiratory effort, lungs clear to auscultation, no wheezes, no ronchi.  Cardiovascular: Normal S1, S2, no murmur, no edema.  Abdomen: Soft, non-tender, no masses, no hepatosplenomegaly.  Psych: Alert and oriented x3, normal affect and mood.    Assessment and Plan:   The following treatment plan was discussed    1. Type 2 diabetes mellitus without complication, without long-term current use of insulin (HCC)  - POCT Hemoglobin A1C  - metFORMIN (GLUCOPHAGE) 500 MG Tab; Take 1 Tablet by mouth 2 times a day with meals.  Dispense: 180 Tablet; Refill: 3    2. Encounter for screening for HIV  - HIV AG/AB COMBO ASSAY SCREENING; Future    3. History of miscarriage    4. Class 1 obesity due to excess calories with serious comorbidity and body mass index (BMI) of 34.0 to 34.9 in adult    Other orders  - Semaglutide,0.25 or 0.5MG/DOS, (OZEMPIC, 0.25 OR 0.5 MG/DOSE,) 2 MG/3ML Solution Pen-injector; Inject 0.25 mg under the skin every 7 days.  Dispense: 3 mL; Refill: 11      Followup: Return in about 3 months (around 7/10/2024) for Lab Review, Diabetes.           "

## 2024-04-10 NOTE — ASSESSMENT & PLAN NOTE
Presents for follow up of diabetes mellitus.   A1c today 8.9  She indicates that she is feeling well and denies any symptoms of chest pain, polyuria, polydipsia, urinary complaints, abdominal complaints, numbness of extremities.     She was on metformin in the past. Restart metformin 500 mg bid with meals to reduce side effect of stomach upset which she had in the past with metformin.     Start ozemic 0.25 mg, advised on side effects.   Continue healthy diet changes: reduce refined starches and avoid sugars and sweet drinks.   Exercise regularly for 30 min most days of the week.       She had severe side effects of muscle cramping, flu like symptoms, diarrhea with rosuvastatin, check fasting lipids.     Last eye exam within the last year.  Denies visual blurring, double vision, eye pain and floaters  Last foot exam: Normal sensation to monofilament bilaterally, no callus, ulcers or deformities. Denies foot pain, numbness, calluses, ulcers    A1c: 8.9 in clinic today  Last labs:   Lab Results   Component Value Date/Time    HBA1C 7.3 (A) 12/29/2023 1325    AVGLUC 123 08/29/2022 0903   Increased from 5.9  Lipids:   Lab Results   Component Value Date/Time    CHOLSTRLTOT 155 08/29/2022 09:03 AM    TRIGLYCERIDE 232 (H) 08/29/2022 09:03 AM    HDL 38 (A) 08/29/2022 09:03 AM    LDL 71 08/29/2022 09:03 AM   ]  BMP:   Lab Results   Component Value Date/Time    SODIUM 136 08/29/2022 0903    POTASSIUM 4.3 08/29/2022 0903    CHLORIDE 101 08/29/2022 0903    CO2 28 08/29/2022 0903    GLUCOSE 103 (H) 08/29/2022 0903    BUN 7 (L) 08/29/2022 0903    CREATININE 0.61 08/29/2022 0903    CALCIUM 9.1 08/29/2022 0903    ANION 7.0 08/29/2022 0903     GFR:   Lab Results   Component Value Date/Time    GFRCKD 113 08/29/2022 0903         Medications: start metformin, ozempic

## 2024-05-06 ENCOUNTER — APPOINTMENT (OUTPATIENT)
Dept: RADIOLOGY | Facility: MEDICAL CENTER | Age: 44
End: 2024-05-06
Attending: FAMILY MEDICINE
Payer: COMMERCIAL

## 2024-10-08 ENCOUNTER — APPOINTMENT (OUTPATIENT)
Dept: MEDICAL GROUP | Facility: PHYSICIAN GROUP | Age: 44
End: 2024-10-08
Payer: COMMERCIAL

## 2025-01-22 ENCOUNTER — HOSPITAL ENCOUNTER (OUTPATIENT)
Facility: MEDICAL CENTER | Age: 45
End: 2025-01-22
Attending: FAMILY MEDICINE
Payer: COMMERCIAL

## 2025-01-22 ENCOUNTER — APPOINTMENT (OUTPATIENT)
Dept: MEDICAL GROUP | Facility: PHYSICIAN GROUP | Age: 45
End: 2025-01-22
Payer: COMMERCIAL

## 2025-01-22 VITALS
BODY MASS INDEX: 32.43 KG/M2 | RESPIRATION RATE: 12 BRPM | SYSTOLIC BLOOD PRESSURE: 128 MMHG | OXYGEN SATURATION: 97 % | DIASTOLIC BLOOD PRESSURE: 72 MMHG | TEMPERATURE: 97.4 F | HEART RATE: 70 BPM | WEIGHT: 183 LBS | HEIGHT: 63 IN

## 2025-01-22 DIAGNOSIS — Z12.12 SCREENING FOR COLORECTAL CANCER: ICD-10-CM

## 2025-01-22 DIAGNOSIS — Z12.11 SCREENING FOR COLORECTAL CANCER: ICD-10-CM

## 2025-01-22 DIAGNOSIS — Z12.31 ENCOUNTER FOR SCREENING MAMMOGRAM FOR BREAST CANCER: ICD-10-CM

## 2025-01-22 DIAGNOSIS — D64.9 ANEMIA, UNSPECIFIED TYPE: ICD-10-CM

## 2025-01-22 DIAGNOSIS — Z11.4 ENCOUNTER FOR SCREENING FOR HIV: ICD-10-CM

## 2025-01-22 DIAGNOSIS — Z11.59 ENCOUNTER FOR HEPATITIS C SCREENING TEST FOR LOW RISK PATIENT: ICD-10-CM

## 2025-01-22 DIAGNOSIS — E11.9 TYPE 2 DIABETES MELLITUS WITHOUT COMPLICATION, WITHOUT LONG-TERM CURRENT USE OF INSULIN (HCC): ICD-10-CM

## 2025-01-22 LAB
CREAT UR-MCNC: 95.23 MG/DL
MICROALBUMIN UR-MCNC: <1.2 MG/DL
MICROALBUMIN/CREAT UR: NORMAL MG/G (ref 0–30)

## 2025-01-22 PROCEDURE — 82570 ASSAY OF URINE CREATININE: CPT

## 2025-01-22 PROCEDURE — 99214 OFFICE O/P EST MOD 30 MIN: CPT | Performed by: FAMILY MEDICINE

## 2025-01-22 PROCEDURE — 3078F DIAST BP <80 MM HG: CPT | Performed by: FAMILY MEDICINE

## 2025-01-22 PROCEDURE — 82043 UR ALBUMIN QUANTITATIVE: CPT

## 2025-01-22 PROCEDURE — 3074F SYST BP LT 130 MM HG: CPT | Performed by: FAMILY MEDICINE

## 2025-01-22 ASSESSMENT — PATIENT HEALTH QUESTIONNAIRE - PHQ9: CLINICAL INTERPRETATION OF PHQ2 SCORE: 0

## 2025-01-22 NOTE — ASSESSMENT & PLAN NOTE
A1c improved from 8.9 down to 5.3 on met 500 bid and ozempic  Has some low sugars rarely, treated fine with food.   Lowest she tested was at 80, she felt lightheaded and dizzy.     Will dec metformin to once a day

## 2025-01-24 NOTE — PROGRESS NOTES
Subjective:   Avril Keller is a 44 y.o. female here today for evaluation and management of:     Type 2 diabetes mellitus without complication, without long-term current use of insulin (CMS-[facility]) ([facility])  A1c improved from 8.9 down to 5.3 on met 500 bid and ozempic  Has some low sugars rarely, treated fine with food.   Lowest she tested was at 80, she felt lightheaded and dizzy.     Will dec metformin to once a day          [facility] Gap Form    Diagnosis to address: E11.9 - Type 2 diabetes mellitus without complication, without long-term current use of insulin ([facility])  Assessment and plan: Chronic, improving. Continue with current defined treatment plan: A1c is 5.3 continue metformin 500 mg once a day Follow-up at least annually.  Last edited 01/23/25 18:27 PST by Nahid Clements M.D.         Current medicines (including changes today)  Current Outpatient Medications   Medication Sig Dispense Refill    metFORMIN (GLUCOPHAGE) 500 MG Tab Take 500 mg by mouth every day.      Semaglutide,0.25 or 0.5MG/DOS, (OZEMPIC, 0.25 OR 0.5 MG/DOSE,) 2 MG/3ML Solution Pen-injector Inject 0.25 mg under the skin every 7 days. 3 mL 11     No current facility-administered medications for this visit.     She  has a past medical history of Diabetes ([facility]) and Hypertension (2014).    She has no past medical history of Anemia, Anxiety, Arrhythmia, Arthritis, Asthma, Breast cancer ([facility]), Cancer ([facility]), CHF (congestive heart failure) ([facility]), Clotting disorder ([facility]), COPD (chronic obstructive pulmonary disease) ([facility]), Depression, Diabetic neuropathy ([facility]), GERD (gastroesophageal reflux disease), Heart attack ([facility]), Heart murmur, HIV (human immunodeficiency virus infection) ([facility]), Hyperlipidemia, Kidney disease, Migraine, Seizure ([facility]), Stroke ([facility]), Substance abuse ([facility]), or Thyroid disease.    ROS  No chest pain, no shortness of breath, no abdominal pain       Objective:     /72 (BP Location: Left arm, Patient Position: Sitting, BP Cuff Size: Adult)   " Pulse 70   Temp 36.3 °C (97.4 °F) (Temporal)   Resp 12   Ht 1.6 m (5' 3\")   Wt 83 kg (183 lb)   SpO2 97%  Body mass index is 32.42 kg/m².   Physical Exam:  Constitutional: Alert, no distress.  Skin: Warm, dry, good turgor, no rashes in visible areas.  Eye: Equal, round and reactive, conjunctiva clear, lids normal.  ENMT: Lips without lesions, good dentition, oropharynx clear.  Neck: Trachea midline, no masses, no thyromegaly. No cervical or supraclavicular lymphadenopathy  Respiratory: Unlabored respiratory effort, lungs clear to auscultation, no wheezes, no ronchi.  Cardiovascular: Normal S1, S2, no murmur, no edema.  Abdomen: Soft, non-tender, no masses, no hepatosplenomegaly.  Psych: Alert and oriented x3, normal affect and mood.    Assessment and Plan:   The following treatment plan was discussed    1. Type 2 diabetes mellitus without complication, without long-term current use of insulin (HCC)  - POCT A1C  - POCT Retinal Eye Exam  - Microalbumin Creat Ratio Urine (Clinic Collect); Future  - Diabetic Monofilament LE Exam  - HEMOGLOBIN A1C; Future  - Lipid Profile; Future  - Comp Metabolic Panel; Future    2. Encounter for screening mammogram for breast cancer  - MA-SCREENING MAMMO BILAT W/TOMOSYNTHESIS W/CAD; Future    3. Screening for colorectal cancer  - Referral to GI for Colonoscopy    4. Encounter for screening for HIV  - HIV AG/AB COMBO ASSAY SCREENING; Future    5. Encounter for hepatitis C screening test for low risk patient  - HEP C VIRUS ANTIBODY; Future    6. Anemia, unspecified type  - CBC WITHOUT DIFFERENTIAL; Future    Other orders  - metFORMIN (GLUCOPHAGE) 500 MG Tab; Take 500 mg by mouth every day.      Followup: Return in about 6 months (around 7/22/2025) for Diabetes.           "

## 2025-03-14 ENCOUNTER — RESULTS FOLLOW-UP (OUTPATIENT)
Dept: MEDICAL GROUP | Facility: PHYSICIAN GROUP | Age: 45
End: 2025-03-14
Payer: COMMERCIAL

## 2025-05-09 ENCOUNTER — HOSPITAL ENCOUNTER (OUTPATIENT)
Dept: RADIOLOGY | Facility: MEDICAL CENTER | Age: 45
End: 2025-05-09
Attending: FAMILY MEDICINE
Payer: COMMERCIAL

## 2025-05-09 DIAGNOSIS — Z12.31 ENCOUNTER FOR SCREENING MAMMOGRAM FOR BREAST CANCER: ICD-10-CM

## 2025-05-09 PROCEDURE — 77067 SCR MAMMO BI INCL CAD: CPT

## 2025-05-14 RX ORDER — SEMAGLUTIDE 0.68 MG/ML
0.25 INJECTION, SOLUTION SUBCUTANEOUS
Qty: 3 ML | Refills: 3 | Status: SHIPPED | OUTPATIENT
Start: 2025-05-14

## 2025-05-28 DIAGNOSIS — Z00.6 CLINICAL TRIAL PARTICIPANT: ICD-10-CM

## 2025-05-30 DIAGNOSIS — R92.30 DENSE BREAST TISSUE: Primary | ICD-10-CM

## 2025-06-10 ENCOUNTER — APPOINTMENT (OUTPATIENT)
Dept: LAB | Facility: MEDICAL CENTER | Age: 45
End: 2025-06-10
Attending: FAMILY MEDICINE
Payer: COMMERCIAL

## 2025-06-10 DIAGNOSIS — Z00.6 CLINICAL TRIAL PARTICIPANT: ICD-10-CM

## 2025-06-21 LAB
APOB+LDLR+PCSK9 GENE MUT ANL BLD/T: NOT DETECTED
BRCA1+BRCA2 DEL+DUP + FULL MUT ANL BLD/T: NOT DETECTED
MLH1+MSH2+MSH6+PMS2 GN DEL+DUP+FUL M: NOT DETECTED

## 2025-06-23 ENCOUNTER — RESULTS FOLLOW-UP (OUTPATIENT)
Dept: MEDICAL GROUP | Facility: PHYSICIAN GROUP | Age: 45
End: 2025-06-23
Payer: COMMERCIAL

## 2025-07-11 DIAGNOSIS — R92.30 DENSE BREAST TISSUE: Primary | ICD-10-CM

## 2025-07-16 RX ORDER — SEMAGLUTIDE 0.68 MG/ML
0.25 INJECTION, SOLUTION SUBCUTANEOUS
Qty: 3 ML | Refills: 3 | Status: SHIPPED | OUTPATIENT
Start: 2025-07-16

## 2025-07-16 NOTE — TELEPHONE ENCOUNTER
Received request via: Pharmacy    Was the patient seen in the last year in this department? Yes    Does the patient have an active prescription (recently filled or refills available) for medication(s) requested? No    Pharmacy Name: [facility]    Does the patient have [facility] Plus and need 100-day supply? (This applies to ALL medications) Patient does not have SCP

## 2025-11-25 ENCOUNTER — APPOINTMENT (OUTPATIENT)
Dept: MEDICAL GROUP | Facility: PHYSICIAN GROUP | Age: 45
End: 2025-11-25